# Patient Record
Sex: FEMALE | Race: OTHER | Employment: UNEMPLOYED | URBAN - METROPOLITAN AREA
[De-identification: names, ages, dates, MRNs, and addresses within clinical notes are randomized per-mention and may not be internally consistent; named-entity substitution may affect disease eponyms.]

---
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
## 2022-06-22 ENCOUNTER — HOSPITAL ENCOUNTER (EMERGENCY)
Age: 22
Discharge: HOME | End: 2022-06-22
Attending: EMERGENCY MEDICINE
Payer: MEDICARE

## 2022-06-22 ENCOUNTER — APPOINTMENT (OUTPATIENT)
Dept: GENERAL RADIOLOGY | Age: 22
End: 2022-06-22
Payer: MEDICARE

## 2022-06-22 VITALS
WEIGHT: 240 LBS | TEMPERATURE: 98.1 F | SYSTOLIC BLOOD PRESSURE: 115 MMHG | OXYGEN SATURATION: 100 % | HEART RATE: 55 BPM | HEIGHT: 64 IN | BODY MASS INDEX: 40.97 KG/M2 | DIASTOLIC BLOOD PRESSURE: 63 MMHG | RESPIRATION RATE: 16 BRPM

## 2022-06-22 DIAGNOSIS — M72.2 PLANTAR FASCIITIS OF RIGHT FOOT: Primary | ICD-10-CM

## 2022-06-22 PROCEDURE — 99283 EMERGENCY DEPT VISIT LOW MDM: CPT

## 2022-06-22 PROCEDURE — 73630 X-RAY EXAM OF FOOT: CPT

## 2022-06-22 ASSESSMENT — PAIN SCALES - GENERAL
PAINLEVEL_OUTOF10: 4
PAINLEVEL_OUTOF10: 7

## 2022-06-22 ASSESSMENT — PAIN DESCRIPTION - LOCATION
LOCATION: ANKLE
LOCATION: ANKLE

## 2022-06-22 ASSESSMENT — PAIN DESCRIPTION - ORIENTATION: ORIENTATION: RIGHT

## 2022-06-22 ASSESSMENT — PAIN - FUNCTIONAL ASSESSMENT: PAIN_FUNCTIONAL_ASSESSMENT: 0-10

## 2022-06-22 NOTE — ED PROVIDER NOTES
[facility] ED                                Emergency Department                                               Faculty Attestation     I performed a history and physical examination of the patient and discussed management with the resident. I reviewed the residents note and agree with the documented findings and plan of care. Any areas of disagreement are noted on the chart. I was personally present for the key portions of any procedures. I have documented in the chart those procedures where I was not present during the key portions. I have reviewed the emergency nurses triage note. I agree with the chief complaint, past medical history, past surgical history, allergies, medications, social and family history as documented unless otherwise noted below. For Physician Assistant/ Nurse Practitioner cases/documentation I have personally evaluated this patient and have completed at least one if not all key elements of the E/M (history, physical exam, and MDM). Additional findings are as noted. This patient was evaluated in the Emergency Department for symptoms described in the history of present illness. He/she was evaluated in the context of the global COVID-19 pandemic, which necessitated consideration that the patient might be at risk for infection with the SARS-CoV-2 virus that causes COVID-19. Institutional protocols and algorithms that pertain to the evaluation of patients at risk for COVID-19 are in a state of rapid change based on information released by regulatory bodies including the CDC and federal and state organizations. These policies and algorithms were followed during the patient's care in the ED. 70-year-old female who is obese presents emergency department with left heel and foot pain that is worse in the morning whenever she steps and put pressure on it. She is on chronic steroids. Has never had plantar fasciitis or heel injury before. Is not a runner.   There is any over-the-counter pain medications without much relief. She evaluation she is in no acute distress. She does have point tenderness over the calcaneus on the plantar aspect of the foot. She is distally neurovascular intact with 2+ DP PT pulses as well as less than 2-second cap refill. No overlying skin changes. Will get plain films to rule out any occult fracture due to obesity and chronic steroids, however likely plantar fasciitis. Discussed calf stretches as well as rolling tennis ball or frozen water bottle under her foot first thing in the morning.      Critical Care: NONE    Naomi Martinez,   Emergency Medicine Physician        Afshin Perales 9177, DO  06/22/22 9115

## 2022-06-22 NOTE — ED PROVIDER NOTES
[facility] [facility]  Emergency Department  Emergency Medicine Resident Sign-out     Care of Mamta Osborne was assumed from Dr. Azzie Epley and is being seen for Ankle Pain  . The patient's initial evaluation and plan have been discussed with the prior provider who initially evaluated the patient. EMERGENCY DEPARTMENT COURSE / MEDICAL DECISION MAKING:       MEDICATIONS GIVEN:  No orders of the defined types were placed in this encounter. LABS / RADIOLOGY:     No results found for this visit on 06/22/22. No results found. RECENT VITALS:     Temp: 98.1 °F (36.7 °C),  Heart Rate: 55, Resp: 16, BP: 115/63, SpO2: 100 %    This patient is a 25 y.o. Female with intermittent pain along the plantar aspect of her right foot specifically on the lateral aspect no trauma symptoms been ongoing for months worse in the morning my physical exam consistent with Planter fasciitis however given patient's history of morbid obesity as well as chronic steroid use for her Wegener's granulomatosis as well as lupus will obtain x-ray imaging. Currently pending radiology interpretation of these films however they reviewed were reviewed by the prior resident  and showed no acute bony abnormalities. OUTSTANDING TASKS / RECOMMENDATIONS:    1. F/u imaging  2. Dispo     FINAL IMPRESSION:     1. Plantar fasciitis of right foot      Patient waited 6 hours in the ED awaiting her xray reads. Upon preliminary review of xrays by ED resident and attending, xrays deemed to have no acute abnormalities. Patient discharged home to follow up with imaging on [facility]. If xrays positive for acute bony abnormalities, patient will be contacted. Patient understands and agrees with the plan.     DISPOSITION:         DISPOSITION:  [x]  Home   []  Nursing Facility   []  Transfer -    []  Admission -     FOLLOW-UP: [facility]  [address] [place] [address]  [phone]  Go to   If symptoms worsen, As needed    Reina Love MD  [address] [facility]  [phone]    Call today  for followup and reevaluation in 1-2 days     DISCHARGE MEDICATIONS: Discharge Medication List as of 6/22/2022  9:29 PM             Jaclyn Mesa DO  Emergency Medicine Resident  [facility]       Jaclyn Mesa [place]  Resident  06/23/22 5894

## 2022-06-22 NOTE — ED NOTES
Pt arrived to ED alert and oriented x4 via triage. Pt c/o ankle and foot pain. Pt reports pain to her right ankle and bottom of her right foot, states that she has a hx of Lupus. Pt reports that she takes Prednisone and another medication for her Lupus. Pt denies injury to her foot or ankle. Pt denies having been around anyone suspected to have COVID-19 or anyone that has been sick, denies recent travel outside the [facility] or [address]. RR even and unlabored. NAD noted. Whiteboard updated. Will continue with plan of care.      Evan Lorenzana RN  06/22/22 1996

## 2022-06-22 NOTE — ED PROVIDER NOTES
[facility]  ED  Emergency Department Encounter  EmergencyMedicine Resident     Pt Stacie Heath  MRN: [id number]  [place] 2000  Date of evaluation: 6/22/22  PCP:  Deann Foley MD    This patient was evaluated in the Emergency Department for symptoms described in the history of present illness. The patient was evaluated in the context of the global COVID-19 pandemic, which necessitated consideration that the patient might be at risk for infection with the SARS-CoV-2 virus that causes COVID-19. Institutional protocols and algorithms that pertain to the evaluation of patients at risk for COVID-19 are in a state of rapid change based on information released by regulatory bodies including the CDC and federal and state organizations. These policies and algorithms were followed during the patient's care in the ED. CHIEF COMPLAINT       Chief Complaint   Patient presents with    Ankle Pain       HISTORY OF PRESENT ILLNESS  (Location/Symptom, Timing/Onset, Context/Setting, Quality, Duration, Modifying Factors, Severity.)      Wayne Mccarthy is a 25 y.o. female who presents with complaint of right foot pain along the lateral aspect. It is worse in the morning and improves after patient taking her medication. Patient has a significant past medical history for lupus as well as Wegener's granulomatosis. She states that she is taking 5 and 2.5 mg prednisone alternating dosing every other day. Denies any acute injuries denies any ankle pain despite her triage complaint. Advised to the dorsum of her foot and along the lateral aspect of the right foot. No injuries previously to this extremity no surgeries. Fevers or chills or constitutional symptoms nothing prior to arrival in terms of interventions. PAST MEDICAL / SURGICAL / SOCIAL / FAMILY HISTORY      has no past medical history on file. As above     has no past surgical history on file.   None to extremity    Social History Socioeconomic History    Marital status: Single     Spouse name: Not on file    Number of children: Not on file    Years of education: Not on file    Highest education level: Not on file   Occupational History    Not on file   Tobacco Use    Smoking status: Never Smoker    Smokeless tobacco: Never Used   Substance and Sexual Activity    Alcohol use: No    Drug use: No    Sexual activity: Not on file   Other Topics Concern    Not on file   Social History Narrative    Not on file     Social Determinants of Health     Financial Resource Strain:     Difficulty of Paying Living Expenses: Not on file   Food Insecurity:     Worried About Running Out of Food in the Last Year: Not on file    Tamara of Food in the Last Year: Not on file   Transportation Needs:     Lack of Transportation (Medical): Not on file    Lack of Transportation (Non-Medical): Not on file   Physical Activity:     Days of Exercise per Week: Not on file    Minutes of Exercise per Session: Not on file   Stress:     Feeling of Stress : Not on file   Social Connections:     Frequency of Communication with Friends and Family: Not on file    Frequency of Social Gatherings with Friends and Family: Not on file    Attends Anabaptism Services: Not on file    Active Member of [address] [place] [address] [employer] or Organizations: Not on file    Attends Club or Organization Meetings: Not on file    Marital Status: Not on file   Intimate Partner Violence:     Fear of Current or Ex-Partner: Not on file    Emotionally Abused: Not on file    Physically Abused: Not on file    Sexually Abused: Not on file   Housing Stability:     Unable to Pay for Housing in the Last Year: Not on file    Number of [place] in the Last Year: Not on file    Unstable Housing in the Last Year: Not on file       History reviewed. No pertinent family history. Allergies:  Patient has no known allergies.     Home Medications:  Prior to Admission medications    Medication Sig Start Date End Date Taking? Authorizing Provider   albuterol (PROVENTIL) (2.5 MG/3ML) 0.083% nebulizer solution Inhale 2.5 mg into the lungs 2/21/19   Historical Provider, MD       REVIEW OF SYSTEMS    (2-9 systems for level 4, 10 or more for level 5)      Review of Systems   Constitutional: Negative for fever. Eyes: Negative for visual disturbance. Respiratory: Negative for shortness of breath. Cardiovascular: Negative for chest pain. Gastrointestinal: Negative for abdominal pain. Genitourinary: Negative for flank pain. Musculoskeletal: Positive for arthralgias and myalgias. Negative for gait problem and joint swelling. Skin: Negative for pallor, rash and wound. Allergic/Immunologic: Negative for environmental allergies and food allergies. Neurological: Negative for numbness. Psychiatric/Behavioral: Negative for agitation and confusion. PHYSICAL EXAM   (up to 7 for level 4, 8 or more for level 5)      INITIAL VITALS:   /63   Pulse 55   Temp 98.1 °F (36.7 °C) (Oral)   Resp 16   Ht 5' 4\" (1.626 m)   Wt 240 lb (108.9 kg)   LMP 06/12/2022   SpO2 100%   BMI 41.20 kg/m²     Physical Exam  Vitals and nursing note reviewed. Constitutional:       General: She is not in acute distress. Appearance: Normal appearance. She is obese. She is not ill-appearing, toxic-appearing or diaphoretic. HENT:      Head: Normocephalic and atraumatic. Right Ear: External ear normal.      Left Ear: External ear normal.      Nose: Nose normal.      Mouth/Throat:      Pharynx: Oropharynx is clear. Eyes:      Conjunctiva/sclera: Conjunctivae normal.   Cardiovascular:      Rate and Rhythm: Normal rate. Pulses: Normal pulses. Pulmonary:      Effort: Pulmonary effort is normal. No respiratory distress. Abdominal:      Palpations: Abdomen is soft. Tenderness: There is no abdominal tenderness. Musculoskeletal:         General: Tenderness present. No deformity or signs of injury.  Normal range of motion. Cervical back: Normal range of motion. Right lower leg: No edema. Left lower leg: No edema. Skin:     General: Skin is warm and dry. Capillary Refill: Capillary refill takes less than 2 seconds. Findings: No bruising, erythema, lesion or rash. Neurological:      Mental Status: She is alert and oriented to person, place, and time. Psychiatric:         Mood and Affect: Mood normal.         DIFFERENTIAL  DIAGNOSIS     PLAN (LABS / IMAGING / EKG):  Orders Placed This Encounter   Procedures    XR FOOT RIGHT (MIN 3 VIEWS)    Ice to affected area       MEDICATIONS ORDERED:  No orders of the defined types were placed in this encounter. DDX: plantar fasciitis, occult fracture    DIAGNOSTIC RESULTS / EMERGENCY DEPARTMENT COURSE / MDM   LAB RESULTS:  No results found for this visit on 06/22/22. IMPRESSION: Patient is alert oriented morbidly obese ambulatory 30-year-old female in no acute distress with intermittent pain to the dorsal aspect plantar surface of her right foot history of physical examination are consistent with plantar fasciitis. Will obtain x-ray imaging to evaluate for any bony abnormalities including occult fracture given patient's chronic steroid use and morbid obesity raising risk for these conditions. RADIOLOGY:  XR FOOT RIGHT (MIN 3 VIEWS)    Result Date: 6/22/2022  EXAMINATION: THREE XRAY VIEWS OF THE RIGHT FOOT 6/22/2022 6:04 pm COMPARISON: None. HISTORY: ORDERING SYSTEM PROVIDED HISTORY: pain along lateral aspect x months eval occult fx pt obese and on steroids TECHNOLOGIST PROVIDED HISTORY: pain along lateral aspect x months eval occult fx pt obese and on steroids Reason for Exam: no known injury FINDINGS: No fracture or malalignment identified. The joint spaces are maintained. No discrete soft tissue abnormality identified. No acute osseous abnormality identified.        EKG      All EKG's are interpreted by the Emergency Department Physician who either signs or Co-signs this chart in the absence of a cardiologist.    EMERGENCY DEPARTMENT COURSE:  Evaluated x-ray imaging unremarkable patient was discharged home in stable condition with outpatient follow-up    No notes of EC Admission Criteria type on file. PROCEDURES:      CONSULTS:  None    CRITICAL CARE:      FINAL IMPRESSION      1.  Plantar fasciitis of right foot          [facility] dr. Gato Porter pending xr interpretation      PATIENT REFERRED TO:  [facility] ED  [address] [place] [address]  [phone]  Go to   If symptoms worsen, As needed    Reina Love MD  [id number] [facility] [phone]    Call today  for followup and reevaluation in 1-2 days      DISCHARGE MEDICATIONS:  Discharge Medication List as of 6/22/2022  9:29 PM          Doni Dow DO  Emergency Medicine Resident    (Please note that portions of thisnote were completed with a voice recognition program.  Efforts were made to edit the dictations but occasionally words are mis-transcribed.)       Doni Dow DO  Resident  06/23/22 [id number]

## 2022-06-22 NOTE — ED PROVIDER NOTES
[facility] ED  Emergency Department  Faculty Sign-Out Addendum     Care of Taya Verdugo was assumed from previous attending and is being seen for Ankle Pain  . The patient's initial evaluation and plan have been discussed with the prior provider who initially evaluated the patient. Handoff taken on the following patient from prior Attending Physician:    Aditi Doherty    I was available and discussed any additional care issues that arose and coordinated the management plans with the resident(s) caring for the patient during my duty period. Any areas of disagreement with residents documentation of care or procedures are noted on the chart. I was personally present for the key portions of any/all procedures during my duty period. I have documented in the chart those procedures where I was not present during the key portions. EMERGENCY DEPARTMENT COURSE / MEDICAL DECISION MAKING:       MEDICATIONS GIVEN:  No orders of the defined types were placed in this encounter. LABS / RADIOLOGY:     Labs Reviewed - No data to display    No results found. RECENT VITALS:     Temp: 98.6 °F (37 °C),  Heart Rate: 66, Resp: 16, BP: (!) 94/58, SpO2: 99 %    This patient is a 25 y.o. Female with foot/ankle pain. Concerning for plantar fasciitis. X-ray to evaluate for potential fracture    OUTSTANDING TASKS / RECOMMENDATIONS:    1.  Follow-up on x-ray results      Shaun Golden MD, [place]  Attending Emergency Physician  [facility] ED        Fara Muñiz MD  06/22/22 [id number]

## 2022-06-23 ASSESSMENT — ENCOUNTER SYMPTOMS
SHORTNESS OF BREATH: 0
ABDOMINAL PAIN: 0

## 2023-12-14 ENCOUNTER — HOSPITAL ENCOUNTER (EMERGENCY)
Age: 23
Discharge: HOME | End: 2023-12-14
Attending: EMERGENCY MEDICINE

## 2023-12-14 ENCOUNTER — APPOINTMENT (OUTPATIENT)
Dept: GENERAL RADIOLOGY | Age: 23
End: 2023-12-14

## 2023-12-14 VITALS
WEIGHT: 239.64 LBS | DIASTOLIC BLOOD PRESSURE: 71 MMHG | SYSTOLIC BLOOD PRESSURE: 118 MMHG | BODY MASS INDEX: 41.13 KG/M2 | OXYGEN SATURATION: 99 % | RESPIRATION RATE: 20 BRPM | TEMPERATURE: 98.1 F | HEART RATE: 78 BPM

## 2023-12-14 DIAGNOSIS — J18.9 PNEUMONIA OF RIGHT LOWER LOBE DUE TO INFECTIOUS ORGANISM: Primary | ICD-10-CM

## 2023-12-14 LAB
CHP ED QC CHECK: YES
FLUAV AG SPEC QL: NEGATIVE
FLUBV AG SPEC QL: NEGATIVE
PREGNANCY TEST URINE, POC: NEGATIVE
SARS-COV-2 RDRP RESP QL NAA+PROBE: NOT DETECTED
SPECIMEN DESCRIPTION: NORMAL

## 2023-12-14 PROCEDURE — 6370000000 HC RX 637 (ALT 250 FOR IP): Performed by: EMERGENCY MEDICINE

## 2023-12-14 PROCEDURE — 71046 X-RAY EXAM CHEST 2 VIEWS: CPT

## 2023-12-14 PROCEDURE — 87635 SARS-COV-2 COVID-19 AMP PRB: CPT

## 2023-12-14 PROCEDURE — 87804 INFLUENZA ASSAY W/OPTIC: CPT

## 2023-12-14 RX ORDER — PREDNISONE 2.5 MG/1
2 TABLET ORAL DAILY
COMMUNITY

## 2023-12-14 RX ORDER — ACETAMINOPHEN 500 MG
1000 TABLET ORAL ONCE
Status: COMPLETED | OUTPATIENT
Start: 2023-12-14 | End: 2023-12-14

## 2023-12-14 RX ORDER — ONDANSETRON 4 MG/1
4 TABLET, ORALLY DISINTEGRATING ORAL ONCE
Status: COMPLETED | OUTPATIENT
Start: 2023-12-14 | End: 2023-12-14

## 2023-12-14 RX ORDER — AMOXICILLIN AND CLAVULANATE POTASSIUM 875; 125 MG/1; MG/1
1 TABLET, FILM COATED ORAL ONCE
Status: COMPLETED | OUTPATIENT
Start: 2023-12-14 | End: 2023-12-14

## 2023-12-14 RX ORDER — HYDROXYCHLOROQUINE SULFATE 200 MG/1
200 TABLET, FILM COATED ORAL DAILY
COMMUNITY

## 2023-12-14 RX ORDER — AMOXICILLIN AND CLAVULANATE POTASSIUM 875; 125 MG/1; MG/1
1 TABLET, FILM COATED ORAL EVERY 12 HOURS SCHEDULED
Status: DISCONTINUED | OUTPATIENT
Start: 2023-12-14 | End: 2023-12-14

## 2023-12-14 RX ORDER — AMOXICILLIN AND CLAVULANATE POTASSIUM 875; 125 MG/1; MG/1
1 TABLET, FILM COATED ORAL 2 TIMES DAILY
Qty: 14 TABLET | Refills: 0 | Status: SHIPPED | OUTPATIENT
Start: 2023-12-14 | End: 2023-12-21

## 2023-12-14 RX ORDER — DOXYCYCLINE HYCLATE 100 MG
100 TABLET ORAL ONCE
Status: COMPLETED | OUTPATIENT
Start: 2023-12-14 | End: 2023-12-14

## 2023-12-14 RX ORDER — DOXYCYCLINE HYCLATE 100 MG
100 TABLET ORAL 2 TIMES DAILY
Qty: 14 TABLET | Refills: 0 | Status: SHIPPED | OUTPATIENT
Start: 2023-12-14 | End: 2023-12-21

## 2023-12-14 RX ADMIN — AMOXICILLIN AND CLAVULANATE POTASSIUM 1 TABLET: 875; 125 TABLET, FILM COATED ORAL at 18:23

## 2023-12-14 RX ADMIN — ACETAMINOPHEN 1000 MG: 500 TABLET ORAL at 17:06

## 2023-12-14 RX ADMIN — DOXYCYCLINE HYCLATE 100 MG: 100 TABLET, COATED ORAL at 18:23

## 2023-12-14 RX ADMIN — ONDANSETRON 4 MG: 4 TABLET, ORALLY DISINTEGRATING ORAL at 17:07

## 2023-12-14 ASSESSMENT — LIFESTYLE VARIABLES
HOW OFTEN DO YOU HAVE A DRINK CONTAINING ALCOHOL: NEVER
HOW MANY STANDARD DRINKS CONTAINING ALCOHOL DO YOU HAVE ON A TYPICAL DAY: PATIENT DOES NOT DRINK

## 2023-12-14 NOTE — DISCHARGE INSTRUCTIONS
You were seen today for cough, body aches, chills and headache. Your COVID was negative. Your influenza test was negative. We did a chest x-ray that shows a pneumonia. We gave you the first dose of antibiotics here. Please take antibiotics as prescribed. If you develop worsening shortness of breath, chest pain, productive cough despite antibiotics please return to the emergency department. Otherwise I recommend that you follow-up with your primary care physician within the next couple of days for reevaluation. If you notice any concerning symptoms please return to the ER immediately. These can include but are not limited to: fevers, chills, shortness of breath, vomiting, weakness of the extremities, changes in your mental status, numbness, pale extremities, or chest pain. Wound care: none    Diet: You may resume your normal diet     Activity: resume activity as tolerated. Medications: Continue taking your home medications as previously directed. For pain You may take tylenol 1,000mg by mouth every 6 hours as needed for pain. Do not exceed 4,000mg per day. If you have liver disease don't take tylenol. You may also take ibuprofen 600mg every 6-8 hours as needed for pain. Do not exceed 2,400 mg per day. If you experience stomach pain or you have a history of kidney disease stop taking ibuprofen. You may alternate application of ice and heat 20 minutes at a time as desired. Follow up: Please follow up with your primary care doctor within one week or as needed.

## 2023-12-14 NOTE — ED PROVIDER NOTES
Pupils: Pupils are equal, round, and reactive to light. Cardiovascular:      Rate and Rhythm: Normal rate and regular rhythm. Heart sounds: Normal heart sounds. Pulmonary:      Effort: Pulmonary effort is normal.      Breath sounds: Normal breath sounds. Abdominal:      General: There is no distension. Palpations: Abdomen is soft. Tenderness: There is no abdominal tenderness. There is no guarding. Musculoskeletal:         General: Normal range of motion. Skin:     General: Skin is warm. Neurological:      General: No focal deficit present. Mental Status: She is alert. DDX/DIAGNOSTIC RESULTS / EMERGENCY DEPARTMENT COURSE / MDM     Medical Decision Making  Cough, congestion, body aches  - Hx of lupus  - CXR with R base PNA  - Influenza negative   - Covid  -Will discharge on Augmentin and doxycycline twice daily for 7 days. Discussed follow up and return precautions with patient and they vocalized understanding. Amount and/or Complexity of Data Reviewed  Labs: ordered. Decision-making details documented in ED Course. Radiology: ordered. Risk  OTC drugs. Prescription drug management. EKG        All EKG's are interpreted by the Emergency Department Physician who either signs or Co-signs this chart in the absence of a cardiologist.    EMERGENCY DEPARTMENT COURSE:        ED Course as of 12/14/23 1817   Thu Dec 14, 2023   1739 Pregnancy, Urine: negative [SK]   1753 XR CHEST (2 VW)  Pneumonia the right lung base. [SK]   1756 Flu B Antigen: NEGATIVE [SK]   1756 Flu A Antigen: NEGATIVE [SK]   2448 SARS-CoV-2, Rapid: Not Detected [SK]      ED Course User Index  [SK] Chastity Squire, DO       PROCEDURES:        CONSULTS:  None    CRITICAL CARE:  There was significant risk of life threatening deterioration of patient's condition requiring my direct management. Critical care time 0 minutes, excluding any documented procedures. FINAL IMPRESSION      1.  Pneumonia of right lower lobe due to infectious organism          DISPOSITION / PLAN     DISPOSITION Decision To Discharge 12/14/2023 06:14:49 PM      PATIENT REFERRED TO:  Bessy Nobles MD  [id number] [facility]            DISCHARGE MEDICATIONS:  New Prescriptions    AMOXICILLIN-CLAVULANATE (AUGMENTIN) 875-125 MG PER TABLET    Take 1 tablet by mouth 2 times daily for 7 days    DOXYCYCLINE HYCLATE (VIBRA-TABS) 100 MG TABLET    Take 1 tablet by mouth 2 times daily for 7 days       Elva Hernandez DO  Emergency Medicine Resident    (Please note that portions of this note were completed with a voice recognition program.  Efforts were made to edit the dictations but occasionally words are mis-transcribed.)        Elva Hernandez DO  Resident  12/14/23 [id number]

## 2023-12-14 NOTE — ED NOTES
The following labs were labeled with appropriate pt sticker and tubed to lab:     [] Blue     [] Lavender   [] on ice  [] Green/yellow  [] Green/black [] on ice  [] Madonna Kitten  [] on ice  [] Yellow  [] Red  [] Pink  [] Type/ Screen  [] ABG  [] VBG    [x] COVID-19 swab    [x] Rapid  [] PCR  [x] Flu swab  [] Peds Viral Panel     [] Urine Sample  [] Fecal Sample  [] Pelvic Cultures  [] Blood Cultures  [] X 2  [] STREP Cultures  [] Wound Cultures         Chung Coon RN  12/14/23 0820

## 2023-12-14 NOTE — ED TRIAGE NOTES
Pt presents to the ER with intermittent headache, sweats, and nausea after eating x 1 week.  Pt states she coughs and brings up mucus which is green in color/ yellow

## 2024-07-01 ENCOUNTER — HOSPITAL ENCOUNTER (EMERGENCY)
Age: 24
Discharge: HOME | End: 2024-07-01
Attending: EMERGENCY MEDICINE
Payer: MEDICAID

## 2024-07-01 VITALS
OXYGEN SATURATION: 98 % | BODY MASS INDEX: 41.44 KG/M2 | RESPIRATION RATE: 18 BRPM | TEMPERATURE: 98.7 F | HEART RATE: 97 BPM | WEIGHT: 241.4 LBS | SYSTOLIC BLOOD PRESSURE: 124 MMHG | DIASTOLIC BLOOD PRESSURE: 76 MMHG

## 2024-07-01 DIAGNOSIS — K05.10 GINGIVITIS: Primary | ICD-10-CM

## 2024-07-01 LAB
ERYTHROCYTE [DISTWIDTH] IN BLOOD BY AUTOMATED COUNT: 13.9 % (ref 11.8–14.4)
HCT VFR BLD AUTO: 30.8 % (ref 36.3–47.1)
HGB BLD-MCNC: 10.5 G/DL (ref 11.9–15.1)
MCH RBC QN AUTO: 32.3 PG (ref 25.2–33.5)
MCHC RBC AUTO-ENTMCNC: 34.1 G/DL (ref 28.4–34.8)
MCV RBC AUTO: 94.8 FL (ref 82.6–102.9)
NRBC BLD-RTO: 0 PER 100 WBC
PLATELET # BLD AUTO: 135 K/UL (ref 138–453)
PMV BLD AUTO: 9.8 FL (ref 8.1–13.5)
RBC # BLD AUTO: 3.25 M/UL (ref 3.95–5.11)
WBC OTHER # BLD: 2 K/UL (ref 4.5–13.5)

## 2024-07-01 PROCEDURE — 85027 COMPLETE CBC AUTOMATED: CPT

## 2024-07-01 PROCEDURE — 99283 EMERGENCY DEPT VISIT LOW MDM: CPT | Performed by: EMERGENCY MEDICINE

## 2024-07-01 PROCEDURE — 6370000000 HC RX 637 (ALT 250 FOR IP)

## 2024-07-01 RX ORDER — PENICILLIN V POTASSIUM 500 MG/1
500 TABLET ORAL 4 TIMES DAILY
Qty: 28 TABLET | Refills: 0 | Status: SHIPPED | OUTPATIENT
Start: 2024-07-01 | End: 2024-07-08

## 2024-07-01 RX ORDER — CHLORHEXIDINE GLUCONATE ORAL RINSE 1.2 MG/ML
15 SOLUTION DENTAL 2 TIMES DAILY
Qty: 420 ML | Refills: 0 | Status: SHIPPED | OUTPATIENT
Start: 2024-07-01 | End: 2024-07-15

## 2024-07-01 RX ORDER — PENICILLIN V POTASSIUM 250 MG/1
500 TABLET ORAL ONCE
Status: COMPLETED | OUTPATIENT
Start: 2024-07-01 | End: 2024-07-01

## 2024-07-01 RX ADMIN — PENICILLIN V POTASSIUM 500 MG: 250 TABLET ORAL at 18:53

## 2024-07-01 ASSESSMENT — ENCOUNTER SYMPTOMS
COUGH: 0
TROUBLE SWALLOWING: 1
ABDOMINAL PAIN: 0
VOMITING: 0
BACK PAIN: 0
DIARRHEA: 0
NAUSEA: 0
SHORTNESS OF BREATH: 0
VOICE CHANGE: 0

## 2024-07-01 NOTE — ED NOTES
Pt is A+Ox4  Pt complains of painful gums with bleeding gums x 1 week  Pt states painful gums when brushing teeth  Pt denies any SOB  Pt denies any chest pain  Pt ambulates with a steady gait from triage to the [facility]  Family at the bedside  All questions answered and needs met at this time

## 2024-07-01 NOTE — DISCHARGE INSTRUCTIONS
You have been evaluated in the Emergency Department at [facility] 7/1/2024     Your recommendations and if necessary prescriptions from today's visit:   -Take medication as prescribed  -Follow-up with your PCP  -Continue to brush your teeth    You need to call Ángela Bernstein MD  to make an appointment as directed for follow up.    Should you have any questions regarding your care or further treatment, please call [facility] Emergency Department at [phone].    PLEASE RETURN TO THE EMERGENCY DEPARTMENT IMMEDIATELY for   -Inability to eat or drink for greater than 24 hours  -Fevers, chills, night sweats  -Changing symptoms  -Worsening symptoms  -Unable to follow up with your primary care provider  -Any other care or concern

## 2024-07-01 NOTE — ED PROVIDER NOTES
[facility]  Emergency Department  Faculty Attestation     I performed a history and physical examination of the patient and discussed management with the resident. I reviewed the resident’s note and agree with the documented findings and plan of care. Any areas of disagreement are noted on the chart. I was personally present for the key portions of any procedures. I have documented in the chart those procedures where I was not present during the key portions. I have reviewed the emergency nurses triage note. I agree with the chief complaint, past medical history, past surgical history, allergies, medications, social and family history as documented unless otherwise noted below.    For Physician Assistant/ Nurse Practitioner cases/documentation I have personally evaluated this patient and have completed at least one if not all key elements of the E/M (history, physical exam, and MDM). Additional findings are as noted.    Preliminary note started at 5:26 PM EDT    Primary Care Physician:  Ángela Bernstein MD    Screenings:  [unfilled]    CHIEF COMPLAINT       Chief Complaint   Patient presents with    Oral Pain    bleeding gums       RECENT VITALS:   Pulse 97   Temp 98.7 °F (37.1 °C) (Oral)   Resp 18   Wt 109.5 kg (241 lb 6.5 oz)   SpO2 98%   BMI 41.44 kg/m²     LABS:  Labs Reviewed - No data to display    Radiology  No orders to display       Attending Physician Additional  Notes    Patient has 1 week of bleeding gums and gum pain especially in the mandibular teeth.  No trauma.  She has had epistaxis before but not frequently.  No frequent bruising per patient however mother states that she does.  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  History of venous thromboembolism but not currently on anticoagulation.  She does take Plaquenil and prednisone as well as monthly treatments and her

## 2024-07-01 NOTE — ED PROVIDER NOTES
[facility] ED  Emergency Department Encounter  Emergency Medicine Resident     Pt Name:Patricia Bansal  MRN: [id number]  Birthdate 2000  Date of evaluation: 7/1/24  PCP:  Ángela Bernstein MD  Note Started: 5:37 PM EDT      CHIEF COMPLAINT       Chief Complaint   Patient presents with    Oral Pain    bleeding gums       HISTORY OF PRESENT ILLNESS  (Location/Symptom, Timing/Onset, Context/Setting, Quality, Duration, Modifying Factors, Severity.)      Patricia Bansal is a 23 y.o. female presenting to ED via walk-in for    CC's: Gum pain, gum bleeding    Patient Dors is approximately 1 week of gingival irritation to the entire mouth as well as sensitivity to food and water.  Patient also with bleeding to gums.  Patient denies any fevers, chills, night sweats.  Patient regularly follows with rheumatology at [facility] and has been taking Hydroxychloroquine since 2018    Notable Hx: Lupus, Wegener's granulomatosis/vasculitis, chronic usage of Hydroxychloroquine    PAST MEDICAL / SURGICAL / SOCIAL / FAMILY HISTORY      has no past medical history on file.       has no past surgical history on file.      Social History     Socioeconomic History    Marital status: Single     Spouse name: Not on file    Number of children: Not on file    Years of education: Not on file    Highest education level: Not on file   Occupational History    Not on file   Tobacco Use    Smoking status: Never    Smokeless tobacco: Never   Substance and Sexual Activity    Alcohol use: No    Drug use: No    Sexual activity: Not on file   Other Topics Concern    Not on file   Social History Narrative    Not on file     Social Determinants of Health     Financial Resource Strain: Not on file   Food Insecurity: Not on file   Transportation Needs: Not on file   Physical Activity: Not on file   Stress: Not on file   Social Connections: Not on file   Intimate Partner Violence: Not on file   Housing Stability: Not on file       No family history on

## 2024-08-09 ENCOUNTER — APPOINTMENT (OUTPATIENT)
Dept: CT IMAGING | Age: 24
End: 2024-08-09
Payer: MEDICAID

## 2024-08-09 ENCOUNTER — HOSPITAL ENCOUNTER (EMERGENCY)
Age: 24
Discharge: HOME | End: 2024-08-09
Attending: EMERGENCY MEDICINE
Payer: MEDICAID

## 2024-08-09 ENCOUNTER — APPOINTMENT (OUTPATIENT)
Dept: GENERAL RADIOLOGY | Age: 24
End: 2024-08-09
Payer: MEDICAID

## 2024-08-09 VITALS
RESPIRATION RATE: 18 BRPM | DIASTOLIC BLOOD PRESSURE: 76 MMHG | SYSTOLIC BLOOD PRESSURE: 115 MMHG | OXYGEN SATURATION: 96 % | TEMPERATURE: 97.9 F | HEART RATE: 102 BPM
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
## 2024-08-09 PROCEDURE — 99284 EMERGENCY DEPT VISIT MOD MDM: CPT

## 2024-08-09 PROCEDURE — 72125 CT NECK SPINE W/O DYE: CPT

## 2024-08-09 PROCEDURE — 73130 X-RAY EXAM OF HAND: CPT

## 2024-08-09 PROCEDURE — 70450 CT HEAD/BRAIN W/O DYE: CPT

## 2024-08-09 PROCEDURE — 70486 CT MAXILLOFACIAL W/O DYE: CPT

## 2024-08-09 PROCEDURE — 6370000000 HC RX 637 (ALT 250 FOR IP)

## 2024-08-09 PROCEDURE — 73000 X-RAY EXAM OF COLLAR BONE: CPT

## 2024-08-09 PROCEDURE — 73030 X-RAY EXAM OF SHOULDER: CPT

## 2024-08-09 RX ORDER — IBUPROFEN 800 MG/1
800 TABLET ORAL ONCE
Status: COMPLETED | OUTPATIENT
Start: 2024-08-09 | End: 2024-08-09

## 2024-08-09 RX ORDER — ACETAMINOPHEN 325 MG/1
650 TABLET ORAL EVERY 6 HOURS PRN
Qty: 120 TABLET | Refills: 0 | Status: SHIPPED | OUTPATIENT
Start: 2024-08-09

## 2024-08-09 RX ORDER — IBUPROFEN 600 MG/1
600 TABLET ORAL EVERY 6 HOURS PRN
Qty: 30 TABLET | Refills: 0 | Status: SHIPPED | OUTPATIENT
Start: 2024-08-09

## 2024-08-09 RX ORDER — CYCLOBENZAPRINE HCL 10 MG
5 TABLET ORAL ONCE
Status: COMPLETED | OUTPATIENT
Start: 2024-08-09 | End: 2024-08-09

## 2024-08-09 RX ORDER — ACETAMINOPHEN 500 MG
1000 TABLET ORAL ONCE
Status: COMPLETED | OUTPATIENT
Start: 2024-08-09 | End: 2024-08-09

## 2024-08-09 RX ORDER — LIDOCAINE 4 G/G
1 PATCH TOPICAL ONCE
Status: DISCONTINUED | OUTPATIENT
Start: 2024-08-09 | End: 2024-08-10 | Stop reason: HOSPADM

## 2024-08-09 RX ORDER — CYCLOBENZAPRINE HCL 5 MG
5 TABLET ORAL NIGHTLY PRN
Qty: 3 TABLET | Refills: 0 | Status: SHIPPED | OUTPATIENT
Start: 2024-08-09 | End: 2024-08-12

## 2024-08-09 RX ADMIN — ACETAMINOPHEN 1000 MG: 500 TABLET ORAL at 19:43

## 2024-08-09 RX ADMIN — IBUPROFEN 800 MG: 800 TABLET, FILM COATED ORAL at 19:43

## 2024-08-09 RX ADMIN — CYCLOBENZAPRINE 5 MG: 10 TABLET, FILM COATED ORAL at 20:51

## 2024-08-09 ASSESSMENT — PAIN DESCRIPTION - DESCRIPTORS: DESCRIPTORS: ACHING

## 2024-08-09 ASSESSMENT — ENCOUNTER SYMPTOMS
FACIAL SWELLING: 1
ABDOMINAL PAIN: 0
BACK PAIN: 0
NAUSEA: 0
VOMITING: 0
SHORTNESS OF BREATH: 0

## 2024-08-09 ASSESSMENT — PAIN DESCRIPTION - ORIENTATION: ORIENTATION: RIGHT

## 2024-08-09 ASSESSMENT — PAIN SCALES - GENERAL: PAINLEVEL_OUTOF10: 10
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
## 2024-08-12 ENCOUNTER — CASE MANAGEMENT (OUTPATIENT)
Dept: PSYCHIATRY | Age: 24
End: 2024-08-12
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
## 2024-08-22 ENCOUNTER — CLINICAL DOCUMENTATION (OUTPATIENT)
Dept: PSYCHIATRY | Age: 24
End: 2024-08-22

## 2024-08-22 NOTE — PROGRESS NOTES
has made a 2nd attempt to contact patient to complete an intake for services with the [facility]. The  left another voice message requesting the patient return the call to [facility]. Therefore this referral will be archived until further notice.

## 2024-08-30 ENCOUNTER — HOSPITAL ENCOUNTER (EMERGENCY)
Age: 24
Discharge: HOME | End: 2024-08-30
Attending: EMERGENCY MEDICINE
Payer: MEDICAID

## 2024-08-30 VITALS
WEIGHT: 242.51 LBS | BODY MASS INDEX: 41.4 KG/M2 | OXYGEN SATURATION: 97 % | SYSTOLIC BLOOD PRESSURE: 138 MMHG | DIASTOLIC BLOOD PRESSURE: 74 MMHG | RESPIRATION RATE: 16 BRPM | HEART RATE: 77 BPM | HEIGHT: 64 IN | TEMPERATURE: 98.8 F

## 2024-08-30 DIAGNOSIS — N93.9 VAGINAL BLEEDING: Primary | ICD-10-CM

## 2024-08-30 LAB
ANION GAP SERPL CALCULATED.3IONS-SCNC: 10 MMOL/L (ref 9–16)
BACTERIA URNS QL MICRO: ABNORMAL
BASOPHILS # BLD: <0.03 K/UL (ref 0–0.2)
BASOPHILS NFR BLD: 0 % (ref 0–2)
BILIRUB UR QL STRIP: NEGATIVE
BUN SERPL-MCNC: 10 MG/DL (ref 6–20)
C TRACH DNA SPEC QL PROBE+SIG AMP: NORMAL
CALCIUM SERPL-MCNC: 9.4 MG/DL (ref 8.6–10.4)
CANDIDA SPECIES: NEGATIVE
CASTS #/AREA URNS LPF: ABNORMAL /LPF (ref 0–8)
CHLORIDE SERPL-SCNC: 104 MMOL/L (ref 98–107)
CLARITY UR: ABNORMAL
CO2 SERPL-SCNC: 25 MMOL/L (ref 20–31)
COLOR UR: ABNORMAL
CREAT SERPL-MCNC: 0.6 MG/DL (ref 0.5–0.9)
EOSINOPHIL # BLD: <0.03 K/UL (ref 0–0.44)
EOSINOPHILS RELATIVE PERCENT: 0 % (ref 1–4)
EPI CELLS #/AREA URNS HPF: ABNORMAL /HPF (ref 0–5)
ERYTHROCYTE [DISTWIDTH] IN BLOOD BY AUTOMATED COUNT: 14.4 % (ref 11.8–14.4)
GARDNERELLA VAGINALIS: POSITIVE
GFR, ESTIMATED: >90 ML/MIN/1.73M2
GLUCOSE SERPL-MCNC: 92 MG/DL (ref 74–99)
GLUCOSE UR STRIP-MCNC: NEGATIVE MG/DL
HCG SERPL QL: NEGATIVE
HCT VFR BLD AUTO: 31.6 % (ref 36.3–47.1)
HGB BLD-MCNC: 10.6 G/DL (ref 11.9–15.1)
HGB UR QL STRIP.AUTO: ABNORMAL
HIV 1+2 AB+HIV1 P24 AG SERPL QL IA: NONREACTIVE
IMM GRANULOCYTES # BLD AUTO: <0.03 K/UL (ref 0–0.3)
IMM GRANULOCYTES NFR BLD: 1 %
KETONES UR STRIP-MCNC: NEGATIVE MG/DL
LEUKOCYTE ESTERASE UR QL STRIP: ABNORMAL
LYMPHOCYTES NFR BLD: 1.38 K/UL (ref 1.1–3.7)
LYMPHOCYTES RELATIVE PERCENT: 33 % (ref 25–45)
MCH RBC QN AUTO: 32.7 PG (ref 25.2–33.5)
MCHC RBC AUTO-ENTMCNC: 33.5 G/DL (ref 28.4–34.8)
MCV RBC AUTO: 97.5 FL (ref 82.6–102.9)
MONOCYTES NFR BLD: 1 % (ref 2–8)
MONOCYTES NFR BLD: <0.03 K/UL (ref 0.1–1.4)
N GONORRHOEA DNA SPEC QL PROBE+SIG AMP: NORMAL
NEUTROPHILS NFR BLD: 65 % (ref 34–64)
NEUTS SEG NFR BLD: 2.73 K/UL (ref 1.5–8.1)
NITRITE UR QL STRIP: NEGATIVE
NRBC BLD-RTO: 0 PER 100 WBC
PH UR STRIP: 5.5 [PH] (ref 5–8)
PLATELET # BLD AUTO: 144 K/UL (ref 138–453)
PMV BLD AUTO: 11.1 FL (ref 8.1–13.5)
POTASSIUM SERPL-SCNC: 4.2 MMOL/L (ref 3.7–5.3)
PROT UR STRIP-MCNC: ABNORMAL MG/DL
RBC # BLD AUTO: 3.24 M/UL (ref 3.95–5.11)
RBC #/AREA URNS HPF: ABNORMAL /HPF (ref 0–4)
SODIUM SERPL-SCNC: 139 MMOL/L (ref 136–145)
SOURCE: ABNORMAL
SP GR UR STRIP: 1.03 (ref 1–1.03)
SPECIMEN DESCRIPTION: NORMAL
T PALLIDUM AB SER QL IA: NONREACTIVE
TRICHOMONAS: NEGATIVE
UROBILINOGEN UR STRIP-ACNC: NORMAL EU/DL (ref 0–1)
WBC #/AREA URNS HPF: ABNORMAL /HPF (ref 0–5)
WBC OTHER # BLD: 4.2 K/UL (ref 4.5–13.5)

## 2024-08-30 PROCEDURE — 96374 THER/PROPH/DIAG INJ IV PUSH: CPT | Performed by: EMERGENCY MEDICINE

## 2024-08-30 PROCEDURE — 87077 CULTURE AEROBIC IDENTIFY: CPT

## 2024-08-30 PROCEDURE — 87491 CHLMYD TRACH DNA AMP PROBE: CPT

## 2024-08-30 PROCEDURE — 85025 COMPLETE CBC W/AUTO DIFF WBC: CPT

## 2024-08-30 PROCEDURE — 99284 EMERGENCY DEPT VISIT MOD MDM: CPT | Performed by: EMERGENCY MEDICINE

## 2024-08-30 PROCEDURE — 87660 TRICHOMONAS VAGIN DIR PROBE: CPT

## 2024-08-30 PROCEDURE — 87510 GARDNER VAG DNA DIR PROBE: CPT

## 2024-08-30 PROCEDURE — 81001 URINALYSIS AUTO W/SCOPE: CPT

## 2024-08-30 PROCEDURE — 87591 N.GONORRHOEAE DNA AMP PROB: CPT

## 2024-08-30 PROCEDURE — 87389 HIV-1 AG W/HIV-1&-2 AB AG IA: CPT

## 2024-08-30 PROCEDURE — 6360000002 HC RX W HCPCS

## 2024-08-30 PROCEDURE — 80048 BASIC METABOLIC PNL TOTAL CA: CPT

## 2024-08-30 PROCEDURE — 86780 TREPONEMA PALLIDUM: CPT

## 2024-08-30 PROCEDURE — 84703 CHORIONIC GONADOTROPIN ASSAY: CPT

## 2024-08-30 PROCEDURE — 87480 CANDIDA DNA DIR PROBE: CPT

## 2024-08-30 PROCEDURE — 87086 URINE CULTURE/COLONY COUNT: CPT

## 2024-08-30 RX ORDER — METRONIDAZOLE 500 MG/1
500 TABLET ORAL 2 TIMES DAILY
Qty: 14 TABLET | Refills: 0 | Status: SHIPPED | OUTPATIENT
Start: 2024-08-30 | End: 2024-09-06

## 2024-08-30 RX ORDER — KETOROLAC TROMETHAMINE 30 MG/ML
30 INJECTION, SOLUTION INTRAMUSCULAR; INTRAVENOUS ONCE
Status: COMPLETED | OUTPATIENT
Start: 2024-08-30 | End: 2024-08-30

## 2024-08-30 RX ADMIN — KETOROLAC TROMETHAMINE 30 MG: 30 INJECTION, SOLUTION INTRAMUSCULAR; INTRAVENOUS at 14:43

## 2024-08-30 ASSESSMENT — ENCOUNTER SYMPTOMS
CONSTIPATION: 0
ABDOMINAL PAIN: 1
DIARRHEA: 0
NAUSEA: 0
VOMITING: 0

## 2024-08-30 ASSESSMENT — PAIN SCALES - GENERAL
PAINLEVEL_OUTOF10: 5
PAINLEVEL_OUTOF10: 3

## 2024-08-30 ASSESSMENT — PAIN DESCRIPTION - LOCATION: LOCATION: ABDOMEN

## 2024-08-30 ASSESSMENT — PAIN DESCRIPTION - DESCRIPTORS: DESCRIPTORS: CRAMPING

## 2024-08-30 ASSESSMENT — PAIN - FUNCTIONAL ASSESSMENT: PAIN_FUNCTIONAL_ASSESSMENT: 0-10

## 2024-08-30 NOTE — ED TRIAGE NOTES
Pt presents to ed pt states she has been having period with \" golf size \" blood clots.   Pt state she had period 8/11/24. Pt denies vomiting/nausea. Pt has abdominal cramps.   Pt denied taking any OTC meds.  Pt alert and oriented x4.

## 2024-08-30 NOTE — DISCHARGE INSTRUCTIONS
Call today or tomorrow to follow up with Ángela Bernstein MD  or your OB / GYN in 3-5 days    Potential miscarriage has not been ruled out in your case of pregnancy.   Use ibuprofen or Tylenol (unless prescribed medications that have Tylenol in it) for pain.  You can take over the counter Ibuprofen (advil) tablets (4 tablets every 8 hours or 3 tablets every 6 hours or 2 tablets every 4 hours)    Return to the Emergency Department for worsening of vaginal bleeding, using more than 4 pads per hour, feeling of weakness, dizzy, nausea / vomiting, any other care or concern.

## 2024-08-30 NOTE — ED PROVIDER NOTES
[facility] ED     Emergency Department     Faculty Attestation        I performed a history and physical examination of the patient and discussed management with the resident. I reviewed the resident’s note and agree with the documented findings and plan of care. Any areas of disagreement are noted on the chart. I was personally present for the key portions of any procedures. I have documented in the chart those procedures where I was not present during the key portions. I have reviewed the emergency nurses triage note. I agree with the chief complaint, past medical history, past surgical history, allergies, medications, social and family history as documented unless otherwise noted below.  For Physician Assistant/ Nurse Practitioner cases/documentation I have personally evaluated this patient and have completed at least one if not all key elements of the E/M (history, physical exam, and MDM). Additional findings are as noted.      Vital Signs: BP: 138/74  Pulse: 77  Respirations: 16  Temp: 98.8 °F (37.1 °C) SpO2: 99 %  PCP:  Ángela Bernstein MD  Note Started: 8/30/24, 2:40 PM EDT    Pertinent Comments:     Patient is a 23-year-old female who had her menstrual cycle a week or 2 ago but now has had bleeding over the last 5 days significant on the third day now improved somewhat but was not at 1 point going through 1 pad per hour.   Lower abdominal cramping and is tender in the suprapubic area but does not localize to left or right.   Denies any chest pain or shortness of breath and no palpitations.    Unsure if she could be pregnant or not and does want \"checked for STDs\" as well.    Assessment/Plan: Patient with vaginal bleeding unknown etiology will obviously obtain serum pregnancy test as well as CBC and awaiting pelvic by resident with swabs  At time symptomatic control and reevaluate after      Critical Care  None      (Please note that portions of this

## 2024-08-30 NOTE — ED PROVIDER NOTES
[facility] ED  Emergency Department Encounter  Emergency Medicine Resident     Pt Name:Patricia Bansal  MRN: [id number]  Birthdate 2000  Date of evaluation: 8/30/24  PCP:  Ángela Bernstein MD  Note Started: 2:16 PM EDT      CHIEF COMPLAINT       Chief Complaint   Patient presents with    Vaginal Bleeding       HISTORY OF PRESENT ILLNESS  (Location/Symptom, Timing/Onset, Context/Setting, Quality, Duration, Modifying Factors, Severity.)      Patricia Bansal is a 23 y.o. female with past medical history of lupus and HPV who presents with vaginal bleeding that began on Monday that started as spotting and worsening to passing clots gradually.  She had to change pad every hour.  States that it has slowed slightly.  8/11 through 8/15 was last period.  Associated abdominal pain and cramping.  10 out of 10 in severity this morning, now down to a 5 out of 10 in severity.  Had unprotected sexual intercourse last week, with like to be tested for sexually transmitted infections.  No vaginal itching or discharge.  No pain with sexual intercourse.  Does not use birth control.  No fever, headache, cold-like symptoms.  No nausea, vomiting, diarrhea, constipation.  No urinary symptoms.    PAST MEDICAL / SURGICAL / SOCIAL / FAMILY HISTORY      has a past medical history of Lupus (HCC).       has no past surgical history on file.      Social History     Socioeconomic History    Marital status: Single     Spouse name: Not on file    Number of children: Not on file    Years of education: Not on file    Highest education level: Not on file   Occupational History    Not on file   Tobacco Use    Smoking status: Never    Smokeless tobacco: Never   Substance and Sexual Activity    Alcohol use: No    Drug use: Yes     Types: Marijuana (Weed)     Comment: few days weekly    Sexual activity: Yes     Partners: Male   Other Topics Concern    Not on file   Social History Narrative    Not on file     Social Determinants of Health

## 2024-08-31 LAB
MICROORGANISM SPEC CULT: ABNORMAL
SERVICE CMNT-IMP: ABNORMAL
SPECIMEN DESCRIPTION: ABNORMAL

## 2024-09-03 LAB
C TRACH DNA SPEC QL PROBE+SIG AMP: ABNORMAL
N GONORRHOEA DNA SPEC QL PROBE+SIG AMP: NEGATIVE
SPECIMEN DESCRIPTION: ABNORMAL

## 2024-09-04 ENCOUNTER — TELEPHONE (OUTPATIENT)
Dept: PHARMACY | Age: 24
End: 2024-09-04

## 2024-09-04 RX ORDER — DOXYCYCLINE HYCLATE 100 MG
100 TABLET ORAL 2 TIMES DAILY
Qty: 14 TABLET | Refills: 0 | Status: SHIPPED | OUTPATIENT
Start: 2024-09-04 | End: 2024-09-11

## 2024-09-04 NOTE — TELEPHONE ENCOUNTER
CLINICAL PHARMACY NOTE:  Telephone Follow-up for Positive STD Test    At the time of Patricia Bansal's visit to [facility] Emergency Department on 8/30/2024 STD testing was performed. DNA testing was positive for Chlamydia.      Pregnancy status:  negative.    Spoke to patient on 9/4/2024 to review test results and regarding need to start oral antibiotic therapy to treat the infection.     Instructed patient to abstain from sexual intercourse until receiving the antibiotic and then for 7 days during treatment. Patient also advised to inform any partners that they will need to be tested as well.  Patient verbally expressed understanding of above plan.      Per protocol, prescription for doxycycline 100mg orally twice daily x 7 days sent to [facility] pharmacy on behalf of Dr. Roger Holland.       Emi LUCIA. Ph., CACP, Clinical Pharmacist  Anticoagulation Services, [facility]  9/4/2024  12:50 PM      For Pharmacy Admin Tracking Only    Intervention Detail: New Rx: 1, reason: Needs Additional Therapy  Total # of Interventions Recommended: 1  Total # of Interventions Accepted: 1  Time Spent (min): 15

## 2024-09-04 NOTE — TELEPHONE ENCOUNTER
Attempt made to reach patient by telephone to review results of STD testing.  Left voice message for return call to [phone].

## 2024-11-26 ENCOUNTER — HOSPITAL ENCOUNTER (EMERGENCY)
Age: 24
Discharge: HOME | End: 2024-11-26
Attending: EMERGENCY MEDICINE
Payer: MEDICAID

## 2024-11-26 ENCOUNTER — APPOINTMENT (OUTPATIENT)
Dept: GENERAL RADIOLOGY | Age: 24
End: 2024-11-26
Payer: MEDICAID

## 2024-11-26 VITALS
OXYGEN SATURATION: 99 % | HEART RATE: 92 BPM | BODY MASS INDEX: 40.97 KG/M2 | WEIGHT: 240 LBS | DIASTOLIC BLOOD PRESSURE: 71 MMHG | TEMPERATURE: 98.7 F | RESPIRATION RATE: 19 BRPM | HEIGHT: 64 IN | SYSTOLIC BLOOD PRESSURE: 112 MMHG

## 2024-11-26 DIAGNOSIS — L03.012 PARONYCHIA OF FINGER OF LEFT HAND: Primary | ICD-10-CM

## 2024-11-26 PROCEDURE — 6360000002 HC RX W HCPCS

## 2024-11-26 PROCEDURE — 99283 EMERGENCY DEPT VISIT LOW MDM: CPT

## 2024-11-26 PROCEDURE — 73130 X-RAY EXAM OF HAND: CPT

## 2024-11-26 PROCEDURE — 6370000000 HC RX 637 (ALT 250 FOR IP)

## 2024-11-26 PROCEDURE — 11740 EVACUATION SUBUNGUAL HMTMA: CPT

## 2024-11-26 RX ORDER — CEPHALEXIN 500 MG/1
500 CAPSULE ORAL ONCE
Status: COMPLETED | OUTPATIENT
Start: 2024-11-26 | End: 2024-11-26

## 2024-11-26 RX ORDER — LIDOCAINE HYDROCHLORIDE 10 MG/ML
20 INJECTION, SOLUTION INFILTRATION; PERINEURAL ONCE
Status: COMPLETED | OUTPATIENT
Start: 2024-11-26 | End: 2024-11-26

## 2024-11-26 RX ORDER — SULFAMETHOXAZOLE AND TRIMETHOPRIM 800; 160 MG/1; MG/1
1 TABLET ORAL 2 TIMES DAILY
Qty: 14 TABLET | Refills: 0 | Status: ON HOLD | OUTPATIENT
Start: 2024-11-26 | End: 2024-12-05 | Stop reason: HOSPADM

## 2024-11-26 RX ORDER — SULFAMETHOXAZOLE AND TRIMETHOPRIM 800; 160 MG/1; MG/1
1 TABLET ORAL ONCE
Status: COMPLETED | OUTPATIENT
Start: 2024-11-26 | End: 2024-11-26

## 2024-11-26 RX ORDER — CEPHALEXIN 500 MG/1
500 CAPSULE ORAL 4 TIMES DAILY
Qty: 28 CAPSULE | Refills: 0 | Status: ON HOLD | OUTPATIENT
Start: 2024-11-26 | End: 2024-12-03

## 2024-11-26 RX ADMIN — SULFAMETHOXAZOLE AND TRIMETHOPRIM 1 TABLET: 800; 160 TABLET ORAL at 21:03

## 2024-11-26 RX ADMIN — LIDOCAINE HYDROCHLORIDE 20 ML: 10 INJECTION, SOLUTION INFILTRATION; PERINEURAL at 19:23

## 2024-11-26 RX ADMIN — CEPHALEXIN 500 MG: 500 CAPSULE ORAL at 21:03

## 2024-11-26 ASSESSMENT — PAIN DESCRIPTION - LOCATION: LOCATION: FINGER (COMMENT WHICH ONE)

## 2024-11-26 ASSESSMENT — PAIN SCALES - GENERAL: PAINLEVEL_OUTOF10: 10

## 2024-11-26 ASSESSMENT — PAIN DESCRIPTION - ORIENTATION: ORIENTATION: LEFT

## 2024-11-26 ASSESSMENT — PAIN - FUNCTIONAL ASSESSMENT: PAIN_FUNCTIONAL_ASSESSMENT: 0-10

## 2024-11-26 NOTE — ED PROVIDER NOTES
[facility]     Emergency Department     Faculty Attestation    I performed a history and physical examination of the patient and discussed management with the resident. I reviewed the resident's note and agree with the documented findings and plan of care. Any areas of disagreement are noted on the chart. I was personally present for the key portions of any procedures. I have documented in the chart those procedures where I was not present during the key portions. I have reviewed the emergency nurses triage note. I agree with the chief complaint, past medical history, past surgical history, allergies, medications, social and family history as documented unless otherwise noted below. For Physician Assistant/ Nurse Practitioner cases/documentation I have personally evaluated this patient and have completed at least one if not all key elements of the E/M (history, physical exam, and MDM). Additional findings are as noted.    Right-hand-dominant, pain and swelling tip of left middle finger.     Anibal Jeffries MD  11/26/24 [id number]
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
## 2024-11-27 NOTE — DISCHARGE INSTRUCTIONS
-You were seen and evaluated by emergency medicine physicians at [facility].    -Please follow-up with your primary care physician and/or with the referrals to specialist.    -You were diagnosed with: Paronychia    -X-ray imaging showed no fracture or dislocation.  The acrylic nail was removed, which showed an infection beneath the nail plate.  Trephination was performed.  Small amount of purulent discharge came out with manipulation of the finger.  You were given antibiotics here in the ED and discharged with a prescription of them.  Please take as prescribed and to completion.  Please follow-up with your PCP in 3 days    -Please return to the Emergency Department if you are experiencing the following symptoms acutely: Increased finger swelling, increased redness and swelling of the finger, decreased range of motion or inability to flex the finger, headache, fever, chills, nausea, vomiting, chest pain, shortness of breath, abdominal pain, change with urination, change with bowel movements, change in your skin/hair/nail, weakness, fatigue, altered mental status and/or any change from baseline health.    -Thank you for coming to [facility].

## 2024-11-27 NOTE — ED NOTES
Received report from Pam NOONAN. Pt A&O x 4, does not appear in acute distress, RR even and unlabored, resting comfortably on stretcher with eyes open. Pt voices no other concerns at this time. Continuing with current POC.

## 2024-11-30 ENCOUNTER — HOSPITAL ENCOUNTER (INPATIENT)
Age: 24
LOS: 10 days | Discharge: HOME | DRG: 720 | End: 2024-12-10
Attending: EMERGENCY MEDICINE | Admitting: INTERNAL MEDICINE
Payer: MEDICAID

## 2024-11-30 ENCOUNTER — APPOINTMENT (OUTPATIENT)
Dept: ULTRASOUND IMAGING | Age: 24
DRG: 720 | End: 2024-11-30
Payer: MEDICAID

## 2024-11-30 ENCOUNTER — APPOINTMENT (OUTPATIENT)
Dept: GENERAL RADIOLOGY | Age: 24
DRG: 720 | End: 2024-11-30
Payer: MEDICAID

## 2024-11-30 DIAGNOSIS — R00.0 TACHYCARDIA: ICD-10-CM

## 2024-11-30 DIAGNOSIS — D70.9 NEUTROPENIA, UNSPECIFIED TYPE (HCC): Primary | ICD-10-CM

## 2024-11-30 DIAGNOSIS — L03.012 ABSCESS AROUND NAIL OF LEFT INDEX FINGER: ICD-10-CM

## 2024-11-30 DIAGNOSIS — M79.642 LEFT HAND PAIN: ICD-10-CM

## 2024-11-30 DIAGNOSIS — R79.89 POSITIVE D DIMER: ICD-10-CM

## 2024-11-30 DIAGNOSIS — L73.9 FOLLICULITIS OF PERINEUM: ICD-10-CM

## 2024-11-30 DIAGNOSIS — N30.01 ACUTE CYSTITIS WITH HEMATURIA: ICD-10-CM

## 2024-11-30 PROBLEM — L03.90 CELLULITIS: Status: ACTIVE | Noted: 2024-11-30

## 2024-11-30 LAB
ALBUMIN SERPL-MCNC: 3.4 G/DL (ref 3.5–5.2)
ALBUMIN/GLOB SERPL: 1 {RATIO} (ref 1–2.5)
ALP SERPL-CCNC: 94 U/L (ref 35–104)
ALT SERPL-CCNC: 736 U/L (ref 10–35)
ANION GAP SERPL CALCULATED.3IONS-SCNC: 10 MMOL/L (ref 9–16)
AST SERPL-CCNC: 1278 U/L (ref 10–35)
BACTERIA URNS QL MICRO: ABNORMAL
BASOPHILS # BLD: 0 K/UL (ref 0–0.2)
BASOPHILS NFR BLD: 0 % (ref 0–2)
BILIRUB SERPL-MCNC: 0.9 MG/DL (ref 0–1.2)
BILIRUB UR QL STRIP: ABNORMAL
BUN SERPL-MCNC: 11 MG/DL (ref 6–20)
C3 SERPL-MCNC: 190 MG/DL (ref 90–180)
C4 SERPL-MCNC: 34 MG/DL (ref 10–40)
CALCIUM SERPL-MCNC: 8.3 MG/DL (ref 8.6–10.4)
CASTS #/AREA URNS LPF: ABNORMAL /LPF (ref 0–8)
CHLORIDE SERPL-SCNC: 98 MMOL/L (ref 98–107)
CLARITY UR: ABNORMAL
CO2 SERPL-SCNC: 23 MMOL/L (ref 20–31)
COLOR UR: ABNORMAL
CREAT SERPL-MCNC: 0.8 MG/DL (ref 0.6–0.9)
CRP SERPL HS-MCNC: 322 MG/L (ref 0–5)
EOSINOPHIL # BLD: 0 K/UL (ref 0–0.4)
EOSINOPHILS RELATIVE PERCENT: 0 % (ref 1–4)
EPI CELLS #/AREA URNS HPF: ABNORMAL /HPF (ref 0–5)
ERYTHROCYTE [DISTWIDTH] IN BLOOD BY AUTOMATED COUNT: 14.6 % (ref 11.8–14.4)
ERYTHROCYTE [SEDIMENTATION RATE] IN BLOOD BY PHOTOMETRIC METHOD: 67 MM/HR (ref 0–20)
GFR, ESTIMATED: >90 ML/MIN/1.73M2
GLUCOSE SERPL-MCNC: 102 MG/DL (ref 74–99)
GLUCOSE UR STRIP-MCNC: NEGATIVE MG/DL
HAV IGM SERPL QL IA: NONREACTIVE
HBV CORE IGM SERPL QL IA: NONREACTIVE
HBV SURFACE AG SERPL QL IA: NONREACTIVE
HCG SERPL QL: NEGATIVE
HCT VFR BLD AUTO: 31.3 % (ref 36.3–47.1)
HCV AB SERPL QL IA: NONREACTIVE
HGB BLD-MCNC: 10.7 G/DL (ref 11.9–15.1)
HGB UR QL STRIP.AUTO: ABNORMAL
HIV 1+2 AB+HIV1 P24 AG SERPL QL IA: NONREACTIVE
IGE SERPL-ACNC: <1 IU/ML (ref 0–100)
IMM GRANULOCYTES # BLD AUTO: 0.01 K/UL (ref 0–0.3)
IMM GRANULOCYTES NFR BLD: 1 %
INR PPP: 1.2
KETONES UR STRIP-MCNC: ABNORMAL MG/DL
LACTIC ACID, WHOLE BLOOD: 1.7 MMOL/L (ref 0.7–2.1)
LEUKOCYTE ESTERASE UR QL STRIP: ABNORMAL
LYMPHOCYTES NFR BLD: 0.5 K/UL (ref 1–4.8)
LYMPHOCYTES RELATIVE PERCENT: 42 % (ref 24–44)
MCH RBC QN AUTO: 31.3 PG (ref 25.2–33.5)
MCHC RBC AUTO-ENTMCNC: 34.2 G/DL (ref 28.4–34.8)
MCV RBC AUTO: 91.5 FL (ref 82.6–102.9)
MONOCYTES NFR BLD: 0.06 K/UL (ref 0.1–0.8)
MONOCYTES NFR BLD: 5 % (ref 1–7)
MORPHOLOGY: ABNORMAL
NEUTROPHILS NFR BLD: 52 % (ref 36–66)
NEUTS SEG NFR BLD: 0.63 K/UL (ref 1.8–7.7)
NITRITE UR QL STRIP: POSITIVE
NRBC BLD-RTO: 0 PER 100 WBC
PARTIAL THROMBOPLASTIN TIME: 24.8 SEC (ref 23–36.5)
PH UR STRIP: 5.5 [PH] (ref 5–8)
PLATELET # BLD AUTO: ABNORMAL K/UL (ref 138–453)
PLATELET, FLUORESCENCE: ABNORMAL K/UL (ref 138–453)
PLATELETS.RETICULATED NFR BLD AUTO: 3.8 % (ref 1.1–10.3)
POTASSIUM SERPL-SCNC: 3.9 MMOL/L (ref 3.7–5.3)
PROCALCITONIN SERPL-MCNC: 0.7 NG/ML (ref 0–0.09)
PROT SERPL-MCNC: 6.7 G/DL (ref 6.6–8.7)
PROT UR STRIP-MCNC: ABNORMAL MG/DL
PROTHROMBIN TIME: 14.7 SEC (ref 11.7–14.9)
RBC # BLD AUTO: 3.42 M/UL (ref 3.95–5.11)
RBC #/AREA URNS HPF: ABNORMAL /HPF (ref 0–4)
SODIUM SERPL-SCNC: 131 MMOL/L (ref 136–145)
SP GR UR STRIP: 1.03 (ref 1–1.03)
UROBILINOGEN UR STRIP-ACNC: ABNORMAL EU/DL (ref 0–1)
WBC #/AREA URNS HPF: ABNORMAL /HPF (ref 0–5)
WBC OTHER # BLD: 1.2 K/UL (ref 3.5–11.3)

## 2024-11-30 PROCEDURE — 80053 COMPREHEN METABOLIC PANEL: CPT

## 2024-11-30 PROCEDURE — 82785 ASSAY OF IGE: CPT

## 2024-11-30 PROCEDURE — 2580000003 HC RX 258: Performed by: INTERNAL MEDICINE

## 2024-11-30 PROCEDURE — 82550 ASSAY OF CK (CPK): CPT

## 2024-11-30 PROCEDURE — 85055 RETICULATED PLATELET ASSAY: CPT

## 2024-11-30 PROCEDURE — 99285 EMERGENCY DEPT VISIT HI MDM: CPT

## 2024-11-30 PROCEDURE — 93005 ELECTROCARDIOGRAM TRACING: CPT | Performed by: INTERNAL MEDICINE

## 2024-11-30 PROCEDURE — 87070 CULTURE OTHR SPECIMN AEROBIC: CPT

## 2024-11-30 PROCEDURE — 2580000003 HC RX 258: Performed by: EMERGENCY MEDICINE

## 2024-11-30 PROCEDURE — 6370000000 HC RX 637 (ALT 250 FOR IP): Performed by: INTERNAL MEDICINE

## 2024-11-30 PROCEDURE — 36415 COLL VENOUS BLD VENIPUNCTURE: CPT

## 2024-11-30 PROCEDURE — 6360000002 HC RX W HCPCS: Performed by: EMERGENCY MEDICINE

## 2024-11-30 PROCEDURE — 6370000000 HC RX 637 (ALT 250 FOR IP)

## 2024-11-30 PROCEDURE — 87075 CULTR BACTERIA EXCEPT BLOOD: CPT

## 2024-11-30 PROCEDURE — 86160 COMPLEMENT ANTIGEN: CPT

## 2024-11-30 PROCEDURE — 85025 COMPLETE CBC W/AUTO DIFF WBC: CPT

## 2024-11-30 PROCEDURE — 85610 PROTHROMBIN TIME: CPT

## 2024-11-30 PROCEDURE — 86038 ANTINUCLEAR ANTIBODIES: CPT

## 2024-11-30 PROCEDURE — 99223 1ST HOSP IP/OBS HIGH 75: CPT | Performed by: INTERNAL MEDICINE

## 2024-11-30 PROCEDURE — 85730 THROMBOPLASTIN TIME PARTIAL: CPT

## 2024-11-30 PROCEDURE — 6360000002 HC RX W HCPCS

## 2024-11-30 PROCEDURE — 87086 URINE CULTURE/COLONY COUNT: CPT

## 2024-11-30 PROCEDURE — 85652 RBC SED RATE AUTOMATED: CPT

## 2024-11-30 PROCEDURE — 87389 HIV-1 AG W/HIV-1&-2 AB AG IA: CPT

## 2024-11-30 PROCEDURE — 81001 URINALYSIS AUTO W/SCOPE: CPT

## 2024-11-30 PROCEDURE — 6360000002 HC RX W HCPCS: Performed by: INTERNAL MEDICINE

## 2024-11-30 PROCEDURE — 87529 HSV DNA AMP PROBE: CPT

## 2024-11-30 PROCEDURE — 80074 ACUTE HEPATITIS PANEL: CPT

## 2024-11-30 PROCEDURE — 83605 ASSAY OF LACTIC ACID: CPT

## 2024-11-30 PROCEDURE — 84703 CHORIONIC GONADOTROPIN ASSAY: CPT

## 2024-11-30 PROCEDURE — 71045 X-RAY EXAM CHEST 1 VIEW: CPT

## 2024-11-30 PROCEDURE — 96365 THER/PROPH/DIAG IV INF INIT: CPT

## 2024-11-30 PROCEDURE — 84145 PROCALCITONIN (PCT): CPT

## 2024-11-30 PROCEDURE — 83874 ASSAY OF MYOGLOBIN: CPT

## 2024-11-30 PROCEDURE — 96367 TX/PROPH/DG ADDL SEQ IV INF: CPT

## 2024-11-30 PROCEDURE — 87641 MR-STAPH DNA AMP PROBE: CPT

## 2024-11-30 PROCEDURE — 87040 BLOOD CULTURE FOR BACTERIA: CPT

## 2024-11-30 PROCEDURE — 2580000003 HC RX 258

## 2024-11-30 PROCEDURE — 2060000000 HC ICU INTERMEDIATE R&B

## 2024-11-30 PROCEDURE — 86225 DNA ANTIBODY NATIVE: CPT

## 2024-11-30 PROCEDURE — 86140 C-REACTIVE PROTEIN: CPT

## 2024-11-30 PROCEDURE — 87205 SMEAR GRAM STAIN: CPT

## 2024-11-30 RX ORDER — ALBUTEROL SULFATE 0.83 MG/ML
2.5 SOLUTION RESPIRATORY (INHALATION) EVERY 4 HOURS PRN
Status: DISCONTINUED | OUTPATIENT
Start: 2024-11-30 | End: 2024-12-10 | Stop reason: HOSPADM

## 2024-11-30 RX ORDER — ONDANSETRON 4 MG/1
4 TABLET, ORALLY DISINTEGRATING ORAL EVERY 8 HOURS PRN
Status: DISCONTINUED | OUTPATIENT
Start: 2024-11-30 | End: 2024-12-10 | Stop reason: HOSPADM

## 2024-11-30 RX ORDER — HYDROCORTISONE SODIUM SUCCINATE 100 MG/2ML
50 INJECTION INTRAMUSCULAR; INTRAVENOUS EVERY 8 HOURS
Status: DISCONTINUED | OUTPATIENT
Start: 2024-11-30 | End: 2024-12-01

## 2024-11-30 RX ORDER — SODIUM CHLORIDE 0.9 % (FLUSH) 0.9 %
5-40 SYRINGE (ML) INJECTION EVERY 12 HOURS SCHEDULED
Status: DISCONTINUED | OUTPATIENT
Start: 2024-11-30 | End: 2024-12-10 | Stop reason: HOSPADM

## 2024-11-30 RX ORDER — POTASSIUM CHLORIDE 7.45 MG/ML
10 INJECTION INTRAVENOUS PRN
Status: DISCONTINUED | OUTPATIENT
Start: 2024-11-30 | End: 2024-12-10 | Stop reason: HOSPADM

## 2024-11-30 RX ORDER — SODIUM CHLORIDE 9 MG/ML
INJECTION, SOLUTION INTRAVENOUS CONTINUOUS
Status: DISCONTINUED | OUTPATIENT
Start: 2024-11-30 | End: 2024-12-03

## 2024-11-30 RX ORDER — SODIUM CHLORIDE 9 MG/ML
INJECTION, SOLUTION INTRAVENOUS PRN
Status: DISCONTINUED | OUTPATIENT
Start: 2024-11-30 | End: 2024-12-10 | Stop reason: HOSPADM

## 2024-11-30 RX ORDER — METRONIDAZOLE 500 MG/100ML
500 INJECTION, SOLUTION INTRAVENOUS EVERY 8 HOURS
Status: DISCONTINUED | OUTPATIENT
Start: 2024-11-30 | End: 2024-12-01

## 2024-11-30 RX ORDER — CLINDAMYCIN HYDROCHLORIDE 150 MG/1
300 CAPSULE ORAL EVERY 8 HOURS SCHEDULED
Status: DISCONTINUED | OUTPATIENT
Start: 2024-11-30 | End: 2024-11-30

## 2024-11-30 RX ORDER — CLOTRIMAZOLE 1 %
CREAM (GRAM) TOPICAL
Qty: 1 EACH | Refills: 1 | Status: SHIPPED | OUTPATIENT
Start: 2024-11-30 | End: 2024-12-07

## 2024-11-30 RX ORDER — MAGNESIUM SULFATE 1 G/100ML
1000 INJECTION INTRAVENOUS PRN
Status: DISCONTINUED | OUTPATIENT
Start: 2024-11-30 | End: 2024-12-10 | Stop reason: HOSPADM

## 2024-11-30 RX ORDER — POTASSIUM CHLORIDE 1500 MG/1
40 TABLET, EXTENDED RELEASE ORAL PRN
Status: DISCONTINUED | OUTPATIENT
Start: 2024-11-30 | End: 2024-12-10 | Stop reason: HOSPADM

## 2024-11-30 RX ORDER — SODIUM CHLORIDE 0.9 % (FLUSH) 0.9 %
10 SYRINGE (ML) INJECTION PRN
Status: DISCONTINUED | OUTPATIENT
Start: 2024-11-30 | End: 2024-12-10 | Stop reason: HOSPADM

## 2024-11-30 RX ORDER — MICONAZOLE NITRATE 20 MG/G
CREAM TOPICAL 2 TIMES DAILY
Status: DISCONTINUED | OUTPATIENT
Start: 2024-11-30 | End: 2024-12-10 | Stop reason: HOSPADM

## 2024-11-30 RX ORDER — LIDOCAINE HYDROCHLORIDE 10 MG/ML
5 INJECTION, SOLUTION INFILTRATION; PERINEURAL ONCE
Status: DISCONTINUED | OUTPATIENT
Start: 2024-11-30 | End: 2024-11-30

## 2024-11-30 RX ORDER — ACETAMINOPHEN 650 MG/1
650 SUPPOSITORY RECTAL EVERY 6 HOURS PRN
Status: DISCONTINUED | OUTPATIENT
Start: 2024-11-30 | End: 2024-12-02

## 2024-11-30 RX ORDER — CLOTRIMAZOLE 1 %
CREAM WITH APPLICATOR VAGINAL 2 TIMES DAILY
Status: DISCONTINUED | OUTPATIENT
Start: 2024-11-30 | End: 2024-12-10 | Stop reason: HOSPADM

## 2024-11-30 RX ORDER — LIDOCAINE 50 MG/G
OINTMENT TOPICAL
Qty: 50 G | Refills: 1 | Status: SHIPPED | OUTPATIENT
Start: 2024-11-30

## 2024-11-30 RX ORDER — ACETAMINOPHEN 325 MG/1
650 TABLET ORAL ONCE
Status: COMPLETED | OUTPATIENT
Start: 2024-11-30 | End: 2024-11-30

## 2024-11-30 RX ORDER — LIDOCAINE HYDROCHLORIDE AND EPINEPHRINE 10; 10 MG/ML; UG/ML
20 INJECTION, SOLUTION INFILTRATION; PERINEURAL ONCE
Status: DISCONTINUED | OUTPATIENT
Start: 2024-11-30 | End: 2024-11-30

## 2024-11-30 RX ORDER — HYDROXYCHLOROQUINE SULFATE 200 MG/1
200 TABLET, FILM COATED ORAL DAILY
Status: DISCONTINUED | OUTPATIENT
Start: 2024-12-01 | End: 2024-12-10 | Stop reason: HOSPADM

## 2024-11-30 RX ORDER — FLUCONAZOLE 50 MG/1
150 TABLET ORAL ONCE
Status: COMPLETED | OUTPATIENT
Start: 2024-11-30 | End: 2024-11-30

## 2024-11-30 RX ORDER — ENOXAPARIN SODIUM 100 MG/ML
30 INJECTION SUBCUTANEOUS 2 TIMES DAILY
Status: DISCONTINUED | OUTPATIENT
Start: 2024-11-30 | End: 2024-12-10 | Stop reason: HOSPADM

## 2024-11-30 RX ORDER — POLYETHYLENE GLYCOL 3350 17 G/17G
17 POWDER, FOR SOLUTION ORAL DAILY PRN
Status: DISCONTINUED | OUTPATIENT
Start: 2024-11-30 | End: 2024-12-10 | Stop reason: HOSPADM

## 2024-11-30 RX ORDER — ACETAMINOPHEN 325 MG/1
650 TABLET ORAL EVERY 6 HOURS PRN
Status: DISCONTINUED | OUTPATIENT
Start: 2024-11-30 | End: 2024-12-02

## 2024-11-30 RX ORDER — 0.9 % SODIUM CHLORIDE 0.9 %
1000 INTRAVENOUS SOLUTION INTRAVENOUS ONCE
Status: COMPLETED | OUTPATIENT
Start: 2024-11-30 | End: 2024-11-30

## 2024-11-30 RX ORDER — LIDOCAINE HYDROCHLORIDE 40 MG/ML
SOLUTION TOPICAL PRN
Status: DISCONTINUED | OUTPATIENT
Start: 2024-11-30 | End: 2024-12-10 | Stop reason: HOSPADM

## 2024-11-30 RX ORDER — CLINDAMYCIN HYDROCHLORIDE 300 MG/1
300 CAPSULE ORAL 3 TIMES DAILY
Qty: 21 CAPSULE | Refills: 0 | Status: SHIPPED | OUTPATIENT
Start: 2024-11-30 | End: 2024-12-10 | Stop reason: HOSPADM

## 2024-11-30 RX ORDER — ONDANSETRON 2 MG/ML
4 INJECTION INTRAMUSCULAR; INTRAVENOUS EVERY 6 HOURS PRN
Status: DISCONTINUED | OUTPATIENT
Start: 2024-11-30 | End: 2024-12-10 | Stop reason: HOSPADM

## 2024-11-30 RX ORDER — LIDOCAINE HYDROCHLORIDE 40 MG/ML
SOLUTION TOPICAL ONCE
Status: COMPLETED | OUTPATIENT
Start: 2024-11-30 | End: 2024-11-30

## 2024-11-30 RX ADMIN — LIDOCAINE HYDROCHLORIDE: 40 SOLUTION TOPICAL at 19:25

## 2024-11-30 RX ADMIN — ACYCLOVIR SODIUM 750 MG: 50 INJECTION, SOLUTION INTRAVENOUS at 18:51

## 2024-11-30 RX ADMIN — ENOXAPARIN SODIUM 30 MG: 100 INJECTION SUBCUTANEOUS at 23:38

## 2024-11-30 RX ADMIN — SODIUM CHLORIDE 1000 ML: 9 INJECTION, SOLUTION INTRAVENOUS at 17:16

## 2024-11-30 RX ADMIN — MICONAZOLE NITRATE: 20 CREAM TOPICAL at 19:23

## 2024-11-30 RX ADMIN — METRONIDAZOLE 500 MG: 500 INJECTION, SOLUTION INTRAVENOUS at 23:19

## 2024-11-30 RX ADMIN — ACETAMINOPHEN 650 MG: 325 TABLET ORAL at 16:41

## 2024-11-30 RX ADMIN — FLUCONAZOLE 150 MG: 50 TABLET ORAL at 23:22

## 2024-11-30 RX ADMIN — SODIUM CHLORIDE: 9 INJECTION, SOLUTION INTRAVENOUS at 23:13

## 2024-11-30 RX ADMIN — SODIUM CHLORIDE 1500 MG: 9 INJECTION, SOLUTION INTRAVENOUS at 23:12

## 2024-11-30 RX ADMIN — HYDROCORTISONE SODIUM SUCCINATE 50 MG: 100 INJECTION, POWDER, FOR SOLUTION INTRAMUSCULAR; INTRAVENOUS at 23:20

## 2024-11-30 RX ADMIN — FLUCONAZOLE 150 MG: 50 TABLET ORAL at 18:24

## 2024-11-30 RX ADMIN — PIPERACILLIN AND TAZOBACTAM 3375 MG: 3; .375 INJECTION, POWDER, LYOPHILIZED, FOR SOLUTION INTRAVENOUS at 17:29

## 2024-11-30 RX ADMIN — SODIUM CHLORIDE: 9 INJECTION, SOLUTION INTRAVENOUS at 23:06

## 2024-11-30 ASSESSMENT — PAIN SCALES - GENERAL
PAINLEVEL_OUTOF10: 8
PAINLEVEL_OUTOF10: 3
PAINLEVEL_OUTOF10: 10

## 2024-11-30 ASSESSMENT — PAIN - FUNCTIONAL ASSESSMENT: PAIN_FUNCTIONAL_ASSESSMENT: 0-10

## 2024-11-30 ASSESSMENT — PAIN DESCRIPTION - ORIENTATION: ORIENTATION: MID

## 2024-11-30 ASSESSMENT — PAIN DESCRIPTION - LOCATION
LOCATION: HAND
LOCATION: ABDOMEN

## 2024-11-30 ASSESSMENT — PAIN DESCRIPTION - DESCRIPTORS
DESCRIPTORS: THROBBING
DESCRIPTORS: CRAMPING

## 2024-11-30 NOTE — ED NOTES
Pt emergency department, c/o painful cutaneous infection of the groin.  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  In approximately October she developed a significant painful sores and erosions over the groin, and self discontinued the 5-FU.  On Wednesday she reports shaving the area, and developed a painful vesicular rash over the skin and has been extruding a yellow purulent discharge.  She also notably has a infection of the finger, and a history of Wegener's granulomatosis controlled with immunosuppressive therapy.

## 2024-12-01 ENCOUNTER — APPOINTMENT (OUTPATIENT)
Dept: ULTRASOUND IMAGING | Age: 24
DRG: 720 | End: 2024-12-01
Payer: MEDICAID

## 2024-12-01 ENCOUNTER — APPOINTMENT (OUTPATIENT)
Dept: CT IMAGING | Age: 24
DRG: 720 | End: 2024-12-01
Payer: MEDICAID

## 2024-12-01 ENCOUNTER — APPOINTMENT (OUTPATIENT)
Dept: GENERAL RADIOLOGY | Age: 24
DRG: 720 | End: 2024-12-01
Payer: MEDICAID

## 2024-12-01 PROBLEM — D84.821 IMMUNOSUPPRESSED DUE TO CHEMOTHERAPY (HCC): Status: ACTIVE | Noted: 2024-12-01

## 2024-12-01 PROBLEM — R79.89 POSITIVE D DIMER: Status: ACTIVE | Noted: 2024-12-01

## 2024-12-01 PROBLEM — L98.491 SKIN ULCER OF PERINEUM, LIMITED TO BREAKDOWN OF SKIN (HCC): Status: ACTIVE | Noted: 2024-12-01

## 2024-12-01 PROBLEM — L73.9 FOLLICULITIS OF PERINEUM: Status: ACTIVE | Noted: 2024-12-01

## 2024-12-01 PROBLEM — R00.0 TACHYCARDIA: Status: ACTIVE | Noted: 2024-12-01

## 2024-12-01 PROBLEM — R79.82 CRP ELEVATED: Status: ACTIVE | Noted: 2024-12-01

## 2024-12-01 PROBLEM — L03.012: Status: ACTIVE | Noted: 2024-12-01

## 2024-12-01 PROBLEM — M79.642 LEFT HAND PAIN: Status: ACTIVE | Noted: 2024-12-01

## 2024-12-01 PROBLEM — D70.9 NEUTROPENIA (HCC): Status: ACTIVE | Noted: 2024-12-01

## 2024-12-01 PROBLEM — M32.9 SLE (SYSTEMIC LUPUS ERYTHEMATOSUS RELATED SYNDROME) (HCC): Status: ACTIVE | Noted: 2024-12-01

## 2024-12-01 PROBLEM — T45.1X5A IMMUNOSUPPRESSED DUE TO CHEMOTHERAPY (HCC): Status: ACTIVE | Noted: 2024-12-01

## 2024-12-01 PROBLEM — Z79.899 IMMUNOSUPPRESSED DUE TO CHEMOTHERAPY (HCC): Status: ACTIVE | Noted: 2024-12-01

## 2024-12-01 PROBLEM — N30.01 ACUTE CYSTITIS WITH HEMATURIA: Status: ACTIVE | Noted: 2024-12-01

## 2024-12-01 LAB
A1AT SERPL-MCNC: 296 MG/DL (ref 90–200)
AFP SERPL-MCNC: <1.8 UG/L
ALBUMIN SERPL-MCNC: 3.1 G/DL (ref 3.5–5.2)
ALBUMIN SERPL-MCNC: 3.7 G/DL (ref 3.5–5.2)
ALBUMIN/GLOB SERPL: 0.9 {RATIO} (ref 1–2.5)
ALBUMIN/GLOB SERPL: 1 {RATIO} (ref 1–2.5)
ALP SERPL-CCNC: 102 U/L (ref 35–104)
ALP SERPL-CCNC: 117 U/L (ref 35–104)
ALT SERPL-CCNC: 1187 U/L (ref 10–35)
ALT SERPL-CCNC: 862 U/L (ref 10–35)
AMPHET UR QL SCN: NEGATIVE
ANION GAP SERPL CALCULATED.3IONS-SCNC: 13 MMOL/L (ref 9–16)
AST SERPL-CCNC: 1480 U/L (ref 10–35)
AST SERPL-CCNC: 2017 U/L (ref 10–35)
BARBITURATES UR QL SCN: NEGATIVE
BASOPHILS # BLD: 0 K/UL (ref 0–0.2)
BASOPHILS NFR BLD: 0 % (ref 0–2)
BENZODIAZ UR QL: NEGATIVE
BILIRUB DIRECT SERPL-MCNC: 0.5 MG/DL (ref 0–0.2)
BILIRUB DIRECT SERPL-MCNC: 0.6 MG/DL (ref 0–0.2)
BILIRUB INDIRECT SERPL-MCNC: 0.3 MG/DL (ref 0–1)
BILIRUB INDIRECT SERPL-MCNC: 0.4 MG/DL (ref 0–1)
BILIRUB SERPL-MCNC: 0.8 MG/DL (ref 0–1.2)
BILIRUB SERPL-MCNC: 1 MG/DL (ref 0–1.2)
BUN SERPL-MCNC: 8 MG/DL (ref 6–20)
C3 SERPL-MCNC: 193 MG/DL (ref 90–180)
C4 SERPL-MCNC: 33 MG/DL (ref 10–40)
CALCIUM SERPL-MCNC: 8.3 MG/DL (ref 8.6–10.4)
CANNABINOIDS UR QL SCN: POSITIVE
CERULOPLASMIN SERPL-MCNC: 37 MG/DL (ref 16–45)
CHLORIDE SERPL-SCNC: 103 MMOL/L (ref 98–107)
CK SERPL-CCNC: 99 U/L (ref 26–192)
CO2 SERPL-SCNC: 19 MMOL/L (ref 20–31)
COCAINE UR QL SCN: NEGATIVE
CREAT SERPL-MCNC: 0.6 MG/DL (ref 0.6–0.9)
D DIMER PPP FEU-MCNC: 15.31 UG/ML FEU (ref 0–0.57)
EOSINOPHIL # BLD: 0.01 K/UL (ref 0–0.4)
EOSINOPHILS RELATIVE PERCENT: 1 % (ref 1–4)
ERYTHROCYTE [DISTWIDTH] IN BLOOD BY AUTOMATED COUNT: 14.2 % (ref 11.8–14.4)
FENTANYL UR QL: NEGATIVE
FERRITIN SERPL-MCNC: 5632 NG/ML (ref 15–150)
FOLATE SERPL-MCNC: 17.6 NG/ML (ref 4.8–24.2)
GFR, ESTIMATED: >90 ML/MIN/1.73M2
GGT SERPL-CCNC: 63 U/L (ref 5–36)
GLOBULIN SER CALC-MCNC: 3.5 G/DL
GLOBULIN SER CALC-MCNC: 3.8 G/DL
GLUCOSE SERPL-MCNC: 117 MG/DL (ref 74–99)
HAPTOGLOB SERPL-MCNC: 176 MG/DL (ref 30–200)
HCT VFR BLD AUTO: 26.6 % (ref 36.3–47.1)
HGB BLD-MCNC: 8.5 G/DL (ref 11.9–15.1)
HSV1 DNA SPEC QL NAA+PROBE: POSITIVE
HSV2 DNA SPEC QL NAA+PROBE: NEGATIVE
IMM GRANULOCYTES # BLD AUTO: 0.03 K/UL (ref 0–0.3)
IMM GRANULOCYTES NFR BLD: 4 %
IMM RETICS NFR: 5.1 % (ref 2.7–18.3)
INR PPP: 1.5
IRON SATN MFR SERPL: 13 % (ref 20–55)
IRON SERPL-MCNC: 37 UG/DL (ref 37–145)
LACTIC ACID, WHOLE BLOOD: 0.8 MMOL/L (ref 0.7–2.1)
LACTIC ACID, WHOLE BLOOD: 1 MMOL/L (ref 0.7–2.1)
LDH SERPL-CCNC: 1597 U/L (ref 135–214)
LYMPHOCYTES NFR BLD: 0.15 K/UL (ref 1–4.8)
LYMPHOCYTES RELATIVE PERCENT: 21 % (ref 24–44)
MCH RBC QN AUTO: 30.7 PG (ref 25.2–33.5)
MCHC RBC AUTO-ENTMCNC: 32 G/DL (ref 28.4–34.8)
MCV RBC AUTO: 96 FL (ref 82.6–102.9)
METHADONE UR QL: NEGATIVE
MICROORGANISM SPEC CULT: NORMAL
MONOCYTES NFR BLD: 0.01 K/UL (ref 0.1–0.8)
MONOCYTES NFR BLD: 1 % (ref 1–7)
MORPHOLOGY: NORMAL
MYOGLOBIN SERPL-MCNC: 27 NG/ML (ref 25–58)
NEUTROPHILS NFR BLD: 73 % (ref 36–66)
NEUTS SEG NFR BLD: 0.5 K/UL (ref 1.8–7.7)
NRBC BLD-RTO: 0 PER 100 WBC
OPIATES UR QL SCN: NEGATIVE
OXYCODONE UR QL SCN: POSITIVE
PCP UR QL SCN: NEGATIVE
PLATELET # BLD AUTO: ABNORMAL K/UL (ref 138–453)
PLATELET, FLUORESCENCE: ABNORMAL K/UL (ref 138–453)
POTASSIUM SERPL-SCNC: 3.9 MMOL/L (ref 3.7–5.3)
PROCALCITONIN SERPL-MCNC: 0.61 NG/ML (ref 0–0.09)
PROT SERPL-MCNC: 6.6 G/DL (ref 6.6–8.7)
PROT SERPL-MCNC: 7.5 G/DL (ref 6.6–8.7)
PROTHROMBIN TIME: 17.4 SEC (ref 11.7–14.9)
RBC # BLD AUTO: 2.77 M/UL (ref 3.95–5.11)
RETIC HEMOGLOBIN: 26.1 PG (ref 28.2–35.7)
RETICS # AUTO: 0.02 M/UL (ref 0.03–0.08)
RETICS/RBC NFR AUTO: 0.6 % (ref 0.5–1.9)
SERVICE CMNT-IMP: NORMAL
SODIUM SERPL-SCNC: 135 MMOL/L (ref 136–145)
SPECIMEN DESCRIPTION: ABNORMAL
SPECIMEN DESCRIPTION: NORMAL
TEST INFORMATION: ABNORMAL
TIBC SERPL-MCNC: 289 UG/DL (ref 250–450)
TSH SERPL DL<=0.05 MIU/L-ACNC: 0.54 UIU/ML (ref 0.27–4.2)
UNSATURATED IRON BINDING CAPACITY: 252 UG/DL (ref 112–347)
VIT B12 SERPL-MCNC: >2000 PG/ML (ref 232–1245)
WBC OTHER # BLD: 0.7 K/UL (ref 3.5–11.3)

## 2024-12-01 PROCEDURE — 83540 ASSAY OF IRON: CPT

## 2024-12-01 PROCEDURE — 80307 DRUG TEST PRSMV CHEM ANLYZR: CPT

## 2024-12-01 PROCEDURE — 82607 VITAMIN B-12: CPT

## 2024-12-01 PROCEDURE — 85045 AUTOMATED RETICULOCYTE COUNT: CPT

## 2024-12-01 PROCEDURE — 87075 CULTR BACTERIA EXCEPT BLOOD: CPT

## 2024-12-01 PROCEDURE — 6360000004 HC RX CONTRAST MEDICATION: Performed by: STUDENT IN AN ORGANIZED HEALTH CARE EDUCATION/TRAINING PROGRAM

## 2024-12-01 PROCEDURE — 99232 SBSQ HOSP IP/OBS MODERATE 35: CPT | Performed by: STUDENT IN AN ORGANIZED HEALTH CARE EDUCATION/TRAINING PROGRAM

## 2024-12-01 PROCEDURE — 71260 CT THORAX DX C+: CPT

## 2024-12-01 PROCEDURE — 80076 HEPATIC FUNCTION PANEL: CPT

## 2024-12-01 PROCEDURE — 85025 COMPLETE CBC W/AUTO DIFF WBC: CPT

## 2024-12-01 PROCEDURE — 82103 ALPHA-1-ANTITRYPSIN TOTAL: CPT

## 2024-12-01 PROCEDURE — 86038 ANTINUCLEAR ANTIBODIES: CPT

## 2024-12-01 PROCEDURE — 6370000000 HC RX 637 (ALT 250 FOR IP): Performed by: INTERNAL MEDICINE

## 2024-12-01 PROCEDURE — 84145 PROCALCITONIN (PCT): CPT

## 2024-12-01 PROCEDURE — 87185 SC STD ENZYME DETCJ PER NZM: CPT

## 2024-12-01 PROCEDURE — 2580000003 HC RX 258: Performed by: INTERNAL MEDICINE

## 2024-12-01 PROCEDURE — 6360000002 HC RX W HCPCS: Performed by: INTERNAL MEDICINE

## 2024-12-01 PROCEDURE — 84443 ASSAY THYROID STIM HORMONE: CPT

## 2024-12-01 PROCEDURE — 82105 ALPHA-FETOPROTEIN SERUM: CPT

## 2024-12-01 PROCEDURE — 87252 VIRUS INOCULATION TISSUE: CPT

## 2024-12-01 PROCEDURE — 86376 MICROSOMAL ANTIBODY EACH: CPT

## 2024-12-01 PROCEDURE — 87186 SC STD MICRODIL/AGAR DIL: CPT

## 2024-12-01 PROCEDURE — 2580000003 HC RX 258: Performed by: STUDENT IN AN ORGANIZED HEALTH CARE EDUCATION/TRAINING PROGRAM

## 2024-12-01 PROCEDURE — 76705 ECHO EXAM OF ABDOMEN: CPT

## 2024-12-01 PROCEDURE — 2060000000 HC ICU INTERMEDIATE R&B

## 2024-12-01 PROCEDURE — 82746 ASSAY OF FOLIC ACID SERUM: CPT

## 2024-12-01 PROCEDURE — 82390 ASSAY OF CERULOPLASMIN: CPT

## 2024-12-01 PROCEDURE — 99254 IP/OBS CNSLTJ NEW/EST MOD 60: CPT | Performed by: INTERNAL MEDICINE

## 2024-12-01 PROCEDURE — 86160 COMPLEMENT ANTIGEN: CPT

## 2024-12-01 PROCEDURE — 82977 ASSAY OF GGT: CPT

## 2024-12-01 PROCEDURE — 83550 IRON BINDING TEST: CPT

## 2024-12-01 PROCEDURE — 87076 CULTURE ANAEROBE IDENT EACH: CPT

## 2024-12-01 PROCEDURE — 83516 IMMUNOASSAY NONANTIBODY: CPT

## 2024-12-01 PROCEDURE — 87077 CULTURE AEROBIC IDENTIFY: CPT

## 2024-12-01 PROCEDURE — 85055 RETICULATED PLATELET ASSAY: CPT

## 2024-12-01 PROCEDURE — 36415 COLL VENOUS BLD VENIPUNCTURE: CPT

## 2024-12-01 PROCEDURE — 6360000002 HC RX W HCPCS: Performed by: STUDENT IN AN ORGANIZED HEALTH CARE EDUCATION/TRAINING PROGRAM

## 2024-12-01 PROCEDURE — 87205 SMEAR GRAM STAIN: CPT

## 2024-12-01 PROCEDURE — 85379 FIBRIN DEGRADATION QUANT: CPT

## 2024-12-01 PROCEDURE — 83010 ASSAY OF HAPTOGLOBIN QUANT: CPT

## 2024-12-01 PROCEDURE — 86225 DNA ANTIBODY NATIVE: CPT

## 2024-12-01 PROCEDURE — A4217 STERILE WATER/SALINE, 500 ML: HCPCS

## 2024-12-01 PROCEDURE — 73130 X-RAY EXAM OF HAND: CPT

## 2024-12-01 PROCEDURE — 6370000000 HC RX 637 (ALT 250 FOR IP): Performed by: STUDENT IN AN ORGANIZED HEALTH CARE EDUCATION/TRAINING PROGRAM

## 2024-12-01 PROCEDURE — 87070 CULTURE OTHR SPECIMN AEROBIC: CPT

## 2024-12-01 PROCEDURE — 2580000003 HC RX 258

## 2024-12-01 PROCEDURE — 83615 LACTATE (LD) (LDH) ENZYME: CPT

## 2024-12-01 PROCEDURE — 6370000000 HC RX 637 (ALT 250 FOR IP): Performed by: NURSE PRACTITIONER

## 2024-12-01 PROCEDURE — 80048 BASIC METABOLIC PNL TOTAL CA: CPT

## 2024-12-01 PROCEDURE — 83605 ASSAY OF LACTIC ACID: CPT

## 2024-12-01 PROCEDURE — 82728 ASSAY OF FERRITIN: CPT

## 2024-12-01 PROCEDURE — 85610 PROTHROMBIN TIME: CPT

## 2024-12-01 PROCEDURE — 87529 HSV DNA AMP PROBE: CPT

## 2024-12-01 RX ORDER — IOPAMIDOL 755 MG/ML
75 INJECTION, SOLUTION INTRAVASCULAR
Status: COMPLETED | OUTPATIENT
Start: 2024-12-01 | End: 2024-12-01

## 2024-12-01 RX ORDER — OXYCODONE AND ACETAMINOPHEN 5; 325 MG/1; MG/1
2 TABLET ORAL EVERY 4 HOURS PRN
Status: DISCONTINUED | OUTPATIENT
Start: 2024-12-01 | End: 2024-12-03

## 2024-12-01 RX ORDER — FAMOTIDINE 20 MG/1
20 TABLET, FILM COATED ORAL 2 TIMES DAILY
Status: DISCONTINUED | OUTPATIENT
Start: 2024-12-01 | End: 2024-12-10 | Stop reason: HOSPADM

## 2024-12-01 RX ORDER — PREDNISONE 10 MG/1
10 TABLET ORAL 2 TIMES DAILY
Status: DISCONTINUED | OUTPATIENT
Start: 2024-12-01 | End: 2024-12-04

## 2024-12-01 RX ORDER — OXYCODONE AND ACETAMINOPHEN 5; 325 MG/1; MG/1
1 TABLET ORAL EVERY 4 HOURS PRN
Status: DISCONTINUED | OUTPATIENT
Start: 2024-12-01 | End: 2024-12-03

## 2024-12-01 RX ORDER — PREDNISONE 10 MG/1
5 TABLET ORAL DAILY
Status: DISCONTINUED | OUTPATIENT
Start: 2024-12-01 | End: 2024-12-01

## 2024-12-01 RX ORDER — IBUPROFEN 600 MG/1
600 TABLET, FILM COATED ORAL EVERY 6 HOURS PRN
Status: DISCONTINUED | OUTPATIENT
Start: 2024-12-01 | End: 2024-12-10 | Stop reason: HOSPADM

## 2024-12-01 RX ADMIN — IBUPROFEN 600 MG: 600 TABLET, FILM COATED ORAL at 03:03

## 2024-12-01 RX ADMIN — IOPAMIDOL 75 ML: 755 INJECTION, SOLUTION INTRAVENOUS at 12:37

## 2024-12-01 RX ADMIN — SODIUM CHLORIDE: 900 IRRIGANT IRRIGATION at 23:19

## 2024-12-01 RX ADMIN — Medication 15 ML: at 13:58

## 2024-12-01 RX ADMIN — CEFEPIME 2000 MG: 2 INJECTION, POWDER, FOR SOLUTION INTRAVENOUS at 01:04

## 2024-12-01 RX ADMIN — FAMOTIDINE 20 MG: 20 TABLET, FILM COATED ORAL at 20:43

## 2024-12-01 RX ADMIN — PREDNISONE 5 MG: 10 TABLET ORAL at 10:58

## 2024-12-01 RX ADMIN — MICONAZOLE NITRATE: 20 CREAM TOPICAL at 20:45

## 2024-12-01 RX ADMIN — HYDROCORTISONE SODIUM SUCCINATE 50 MG: 100 INJECTION, POWDER, FOR SOLUTION INTRAMUSCULAR; INTRAVENOUS at 06:39

## 2024-12-01 RX ADMIN — FAMOTIDINE 20 MG: 20 TABLET, FILM COATED ORAL at 09:09

## 2024-12-01 RX ADMIN — PREDNISONE 10 MG: 10 TABLET ORAL at 20:43

## 2024-12-01 RX ADMIN — LIDOCAINE HYDROCHLORIDE: 40 SOLUTION TOPICAL at 16:14

## 2024-12-01 RX ADMIN — LIDOCAINE HYDROCHLORIDE: 40 SOLUTION TOPICAL at 05:17

## 2024-12-01 RX ADMIN — SODIUM CHLORIDE 1500 MG: 9 INJECTION, SOLUTION INTRAVENOUS at 09:12

## 2024-12-01 RX ADMIN — METRONIDAZOLE 500 MG: 500 INJECTION, SOLUTION INTRAVENOUS at 13:57

## 2024-12-01 RX ADMIN — ENOXAPARIN SODIUM 30 MG: 100 INJECTION SUBCUTANEOUS at 09:09

## 2024-12-01 RX ADMIN — MICONAZOLE NITRATE: 20 CREAM TOPICAL at 10:58

## 2024-12-01 RX ADMIN — ACYCLOVIR SODIUM 550 MG: 50 INJECTION, SOLUTION INTRAVENOUS at 11:00

## 2024-12-01 RX ADMIN — CLOTRIMAZOLE: 10 CREAM VAGINAL at 02:14

## 2024-12-01 RX ADMIN — Medication 15 ML: at 09:07

## 2024-12-01 RX ADMIN — SODIUM CHLORIDE: 900 IRRIGANT IRRIGATION at 13:57

## 2024-12-01 RX ADMIN — ACYCLOVIR SODIUM 750 MG: 50 INJECTION, SOLUTION INTRAVENOUS at 03:05

## 2024-12-01 RX ADMIN — SODIUM CHLORIDE, PRESERVATIVE FREE 10 ML: 5 INJECTION INTRAVENOUS at 20:43

## 2024-12-01 RX ADMIN — LIDOCAINE HYDROCHLORIDE: 40 SOLUTION TOPICAL at 02:14

## 2024-12-01 RX ADMIN — CEFEPIME 2000 MG: 2 INJECTION, POWDER, FOR SOLUTION INTRAVENOUS at 09:16

## 2024-12-01 RX ADMIN — ACETAMINOPHEN 650 MG: 325 TABLET ORAL at 23:33

## 2024-12-01 RX ADMIN — SODIUM CHLORIDE: 900 IRRIGANT IRRIGATION at 11:20

## 2024-12-01 RX ADMIN — ACYCLOVIR SODIUM 550 MG: 50 INJECTION, SOLUTION INTRAVENOUS at 18:01

## 2024-12-01 RX ADMIN — METRONIDAZOLE 500 MG: 500 INJECTION, SOLUTION INTRAVENOUS at 06:45

## 2024-12-01 RX ADMIN — CEFEPIME 2000 MG: 2 INJECTION, POWDER, FOR SOLUTION INTRAVENOUS at 23:39

## 2024-12-01 RX ADMIN — SODIUM CHLORIDE 1500 MG: 9 INJECTION, SOLUTION INTRAVENOUS at 23:44

## 2024-12-01 RX ADMIN — SODIUM CHLORIDE: 900 IRRIGANT IRRIGATION at 20:46

## 2024-12-01 RX ADMIN — CLOTRIMAZOLE: 10 CREAM VAGINAL at 19:28

## 2024-12-01 RX ADMIN — CLOTRIMAZOLE: 10 CREAM VAGINAL at 09:09

## 2024-12-01 RX ADMIN — Medication 15 ML: at 23:19

## 2024-12-01 RX ADMIN — ENOXAPARIN SODIUM 30 MG: 100 INJECTION SUBCUTANEOUS at 20:43

## 2024-12-01 RX ADMIN — ACETAMINOPHEN 650 MG: 325 TABLET ORAL at 01:31

## 2024-12-01 ASSESSMENT — PAIN SCALES - GENERAL
PAINLEVEL_OUTOF10: 0
PAINLEVEL_OUTOF10: 6
PAINLEVEL_OUTOF10: 0
PAINLEVEL_OUTOF10: 0
PAINLEVEL_OUTOF10: 10
PAINLEVEL_OUTOF10: 3
PAINLEVEL_OUTOF10: 0

## 2024-12-01 ASSESSMENT — PAIN DESCRIPTION - LOCATION
LOCATION: MOUTH
LOCATION: MOUTH
LOCATION: VAGINA

## 2024-12-01 ASSESSMENT — PAIN DESCRIPTION - ORIENTATION
ORIENTATION: ANTERIOR
ORIENTATION: MID
ORIENTATION: ANTERIOR

## 2024-12-01 ASSESSMENT — PAIN DESCRIPTION - DESCRIPTORS
DESCRIPTORS: ACHING
DESCRIPTORS: ACHING
DESCRIPTORS: BURNING

## 2024-12-01 NOTE — H&P
gynecology reevaluation and recommended Diflucan, acyclovir, terbinafine topical and empiric antibiotics pending culture results.  Lidocaine as needed for vaginal discomfort.  Previously recommended for clinical exam under anesthesia 12/04.    Paronychia status post failed outpatient treatment.  Discussed with Dr. Piña.  Does not think likely source of sepsis.  Will evaluate in a.m..  Possible UTI empiric antibiotics as above  Acute hepatitis/transaminitis.  Patient denies significant binge drinking.  Prior labs with unremarkable LFTs.  Previously documented vaccination to hepatitis A and B.  Check acute hepatitis panel.  Check serologies to evaluate possible vasculitis/acute autoimmune process.  Check IgE with recent antibiotics but less likely with AST more than ALT elevation.  Check right upper quadrant ultrasound.   trend labs with consideration for GI evaluation.  Consider surgery evaluation if concern for cholecystitis-less likely with normal alkaline phosphatase  Mucositis.  Start Magic mouthwash.    Suspected SIRS/sepsis.  Empiric antibiotics for now pending culture results, and ID input.  Labs w/ Neutropenia with vacuolated neutrophils/coarse granular PMNs strongly suggest bacterial sepsis.  Empiric antibiotics pending cultures.     Check further labs to evaluate nutritional deficiencies.  Consider heme eval if further concern  Sinus tachycardia/orthostasis with borderline hypotension.  Possible component of adrenal insufficiency versus related to SIRS/sepsis versus dehydration.  Status post IV fluid bolus.  Continued maintenance.  Status post stress dose steroids.  Check thyroid studies  ANCA negative GPA/systemic lupus on chronic immunosuppression.  UA with + blood /2+protein.  as above pending serologies.  Pending rheumatology evaluation.  Recent serologies have all been stable.  Prior ANCA negative.    History of PE 2020.  Consider CT angio to evaluate with pleurisy/shortness of

## 2024-12-01 NOTE — ED NOTES
The following labs were labeled with appropriate pt sticker and tubed to lab:     [] Blue     [] Lavender   [] on ice  [x] Green/yellow  [x] Green/black [x] on ice  [] Grey  [] on ice  [x] Yellow  [] Red  [] Pink  [] Type/ Screen  [] ABG  [] VBG    [] COVID-19 swab    [] Rapid  [] PCR  [] Flu swab  [] Peds Viral Panel     [] Urine Sample  [] Fecal Sample  [] Pelvic Cultures  [] Blood Cultures  [] X 2  [] STREP Cultures  [] Wound Cultures

## 2024-12-01 NOTE — ED PROVIDER NOTES
[facility]     Emergency Department     Faculty Attestation    I performed a history and physical examination of the patient and discussed management with the resident. I have reviewed and agree with the resident’s findings including all diagnostic interpretations, and treatment plans as written at the time of my review. Any areas of disagreement are noted on the chart. I was personally present for the key portions of any procedures. I have documented in the chart those procedures where I was not present during the key portions. For Physician Assistant/ Nurse Practitioner cases/documentation I have personally evaluated this patient and have completed at least one if not all key elements of the E/M (history, physical exam, and MDM). Additional findings are as noted.    PtName: Patricia Bansal  MRN: [id number]  Birthdate 2000  Date of evaluation: 11/30/24  Note Started: 3:24 PM EST    Primary Care Physician: Ángela Bernstein MD        History: This is a 24 y.o. female who presents to the Emergency Department with complaint of finger pain.  Patient presents emerged her complaining of index finger pain for she was seen recently and placed on antibiotics without any improvement.  She has complaints of some pain and swelling in the labia.  She has complaints of fever and sweats.    Physical:   height is 1.626 m (5' 4\") and weight is 108.9 kg (240 lb). Her oral temperature is 103 °F (39.4 °C) (abnormal). Her blood pressure is 108/69 and her pulse is 131 (abnormal). Her respiration is 20 and oxygen saturation is 97%.  Patient is tachycardic, no respiratory distress noted, abdomen is soft, patient does have significant swelling to the left long finger consistent with paronychia.  Pelvic exam performed by the resident.  Pelvic semination does show multiple vesicular lesions in the pelvic vaginal region area    Impression: Paronychia, HSV    Plan: Antipyretic, CBC, CMP, 
candida.    Will d/c vaginitis probe, still do vaginal exam checking for tenderness any physical signs infection.  [KB]   1559 Patient may be possible admit, will be started on fluid bolus at this time. Likely will need an I&D of finger. Will obtain cultures before starting on antibiotics. [KB]   1615 Vaginal and pelvic exam completed. Patient has a significant amount of raised non erythematous, non vesicular lesions along labia majora and labia minora. Not visualized in vaginal canal- significant clear discharge noted at cervical os. Attending asked to look at. Will  consult OBGYN for further assessment.  [KB]   1620 WBC Count 1.2  Started on Zosyn due to immunocompromised, pos SIRS criteria    Patient reports she has hx of lupus and Wegener's disease- was on prednisone and plaquenil until she ran out of medication. Reports ran out of medications a few weeks ago- unsure when was last date she took medications.   [KB]   1630 Spoke with GYN resident and Erin Mtz DO. OBGYN team will see and assess.     Due to finger lesion and concern may be Herpetic Kayla, current OB history will start patient on acyclovir.    [KB]   1635 OBGYN assessed. Believe lesions are possible bacterial folliculitis due to shaving. Recommend clindamycin and topical terbinafine. Lidocaine for pain. Is appropriate to continue dosage acyclovir.  [KB]   1730 Urinalysis positive for UTI with moderate leukocytes, positive nitrates. Spoke with patient. Reports has been taking Bactrim and Keflex the past 3 days as prescribed. With no signs of improvement to finger as well as positive UTI failed trial of outpatient antibiotics. Due to patients low WBC, hx of immunocomprised conditions will plan for an ID consult as well admit to IM.  [KB]   8773 Spoke with Dr. Varela and OBGYN residency team. Ok with any ID recommendations and that ID may take primary for antibiotic recommendations at this time.  [KB]   1800 Updated patient and mother 
possible for a visit      Danielle Wills MD  [address], [address]  [facility] [address]  [phone]    Schedule an appointment as soon as possible for a visit in 1 month(s)      Ángela Bernstein MD  [address] [facility] [id number] [phone]    Schedule an appointment as soon as possible for a visit in 1 week(s)  for follow up      DISCHARGE MEDICATIONS:  Discharge Medication List as of 12/10/2024  3:58 PM        START taking these medications    Details   !! acyclovir (ZOVIRAX) 800 MG tablet Take 1 tablet by mouth 3 times daily for 14 days 14 days till 12/24/24  then stop and start  acyclovir 400 mg po 2 x per day long term suppression, Disp-42 tablet, R-0Normal      !! acyclovir (ZOVIRAX) 400 MG tablet Take 1 tablet by mouth 2 times daily Start 11/25/24 and keep long term to suppress the herpes from coming back-, Disp-60 tablet, R-11Normal      neomycin-bacitracin-polymyxin (NEOSPORIN) 5-400-5000 ointment Apply topically 4 times daily., Disp-5 g, R-3, Normal      clotrimazole (LOTRIMIN) 1 % vaginal cream Place vaginally 2 times daily.OTC      docusate sodium (COLACE, DULCOLAX) 100 MG CAPS Take 200 mg by mouth 2 times daily, Disp-40 capsule, R-0Normal      miconazole (MICOTIN) 2 % cream Apply topically 2 times daily., OTC      polyethylene glycol (GLYCOLAX) 17 g packet Take 1 packet by mouth daily as needed for ConstipationOTC      oxyCODONE (ROXICODONE) 5 MG immediate release tablet Take 1 tablet by mouth every 6 hours as needed for Pain for up to 3 days. Max Daily Amount: 20 mg, Disp-12 tablet, R-0Print      lidocaine (XYLOCAINE) 5 % ointment Apply topically as needed., Disp-50 g, R-1, Normal       !! - Potential duplicate medications found. Please discuss with provider.          Gloria Winters MD  PGY-1 Transitional Year Resident  Emergency Medicine     (Please note that portions of this note were completed with a voice recognition program.  Efforts were made to edit the dictations but occasionally

## 2024-12-01 NOTE — ED NOTES
Report received from Sharona ERNANDEZ, all questions answered. Pt is resting in bed comfortably, NAD noted. Pt placed on SpO2 monitor. Care ongoing

## 2024-12-01 NOTE — ED NOTES
ED to inpatient nurses report    Hand pain    Present to ED from home  LOC: alert and orientated to name, place, date  Vital signs   Vitals:    11/30/24 1427 11/30/24 1428 11/30/24 1745   BP: 108/69     Pulse: (!) 131  (!) 105   Resp:  20    Temp: (!) 103 °F (39.4 °C)  100.3 °F (37.9 °C)   TempSrc: Oral  Oral   SpO2: 97%     Weight: 108.9 kg (240 lb)     Height: 1.626 m (5' 4\")        Oxygen Baseline RA  Current needs required antibiotics  LDAs:   Peripheral IV 11/30/24 (Active)     Mobility: Independent  Pending ED orders:   Present condition: Stable  Code Status: [unfilled]   Consults:  []  Hospitalist  Completed  [] yes [] no  []  Medicine  Completed  [] yes [] No  []  Cardiology  Completed  [] yes [] No  []  GI   Completed  [] yes [] No  []  Neurology  Completed  [] yes [] No  []  Nephrology Completed  [] yes [] No  []  Vascular  Completed  [] yes [] No   []  Surgery  Completed  [] yes [] No   []  Urology  Completed  [] yes [] No   []  Plastics  Completed  [] yes [] No   []  ENT  Completed  [] yes [] No   []  Other OB   Completed  [x] yes [] No  Pertinent event(s) pt is immunosuppressive  Pertinent event(s) Pt to emergency department, c/o painful cutaneous infection of the groin.  Patient reports that she has been treated October she developed a significant painful sores and erosions over the groin, and self discontinued the 5-FU.  On Wednesday she reports shaving the area, and developed a painful vesicular rash over the skin and has been extruding a yellow purulent discharge.  She also notably has a infection of the finger, and a history of Wegener's granulomatosis controlled with immunosuppressive therapy.   Electronically signed by Sharona MCGRAW RN on 11/30/2024 at 7:13 PM

## 2024-12-01 NOTE — CARE COORDINATION
Case Management Assessment  Initial Evaluation    Date/Time of Evaluation: 12/1/2024 4:38 PM  Assessment Completed by: Camille Brown RN    If patient is discharged prior to next notation, then this note serves as note for discharge by case management.    Patient Name: Patricia Bansal                   YOB: 2000  Diagnosis: Cellulitis [L03.90]  Left hand pain [M79.642]  Folliculitis of perineum [L73.9]  Acute cystitis with hematuria [N30.01]  Neutropenia, unspecified type (HCC) [D70.9]                   Date / Time: 11/30/2024  2:43 PM    Patient Admission Status: Inpatient   Readmission Risk (Low < 19, Mod (19-27), High > 27): Readmission Risk Score: 11.1    Current PCP:   PCP verified by CM? (P) No (does not have PCP. list provided)    Chart Reviewed: Yes      History Provided by: (P) Patient  Patient Orientation: (P) Alert and Oriented    Patient Cognition: (P) Alert    Hospitalization in the last 30 days (Readmission):  No    If yes, Readmission Assessment in CM Navigator will be completed.    Advance Directives:      Code Status: Full Code   Patient's Primary Decision Maker is: (P) Legal Next of Kin      Discharge Planning:    Patient lives with: (P) Alone Type of Home: (P) Apartment  Primary Care Giver: (P) Self  Patient Support Systems include: (P) Parent   Current Financial resources: (P) Medicaid  Current community resources:    Current services prior to admission: (P) None            Current DME:              Type of Home Care services:  (P) None    ADLS  Prior functional level: (P) Independent in ADLs/IADLs  Current functional level: (P) Independent in ADLs/IADLs    PT AM-PAC:   /24  OT AM-PAC:   /24    Family can provide assistance at DC: (P) No  Would you like Case Management to discuss the discharge plan with any other family members/significant others, and if so, who? (P) No  Plans to Return to Present Housing: (P) Yes  Other Identified Issues/Barriers to RETURNING to current housing:

## 2024-12-02 ENCOUNTER — APPOINTMENT (OUTPATIENT)
Dept: VASCULAR LAB | Age: 24
DRG: 720 | End: 2024-12-02
Attending: STUDENT IN AN ORGANIZED HEALTH CARE EDUCATION/TRAINING PROGRAM
Payer: MEDICAID

## 2024-12-02 ENCOUNTER — APPOINTMENT (OUTPATIENT)
Age: 24
DRG: 720 | End: 2024-12-02
Attending: INTERNAL MEDICINE
Payer: MEDICAID

## 2024-12-02 PROBLEM — N30.01 ACUTE CYSTITIS WITH HEMATURIA: Status: RESOLVED | Noted: 2024-12-01 | Resolved: 2024-12-02

## 2024-12-02 PROBLEM — D61.818 PANCYTOPENIA (HCC): Status: ACTIVE | Noted: 2024-12-02

## 2024-12-02 PROBLEM — R79.89 POSITIVE D DIMER: Status: ACTIVE | Noted: 2024-12-02

## 2024-12-02 PROBLEM — M79.642 LEFT HAND PAIN: Status: RESOLVED | Noted: 2024-12-01 | Resolved: 2024-12-02

## 2024-12-02 PROBLEM — D70.9 NEUTROPENIA (HCC): Status: RESOLVED | Noted: 2024-12-01 | Resolved: 2024-12-02

## 2024-12-02 PROBLEM — B00.9 HERPES SIMPLEX VIRUS (HSV) INFECTION: Status: ACTIVE | Noted: 2024-12-02

## 2024-12-02 LAB
ALBUMIN SERPL-MCNC: 3.2 G/DL (ref 3.5–5.2)
ALBUMIN/GLOB SERPL: 1.1 {RATIO} (ref 1–2.5)
ALP SERPL-CCNC: 107 U/L (ref 35–104)
ALT SERPL-CCNC: 924 U/L (ref 10–35)
ANA SER QL IA: NEGATIVE
ANA SER QL IA: NEGATIVE
ANION GAP SERPL CALCULATED.3IONS-SCNC: 11 MMOL/L (ref 9–16)
AST SERPL-CCNC: 1279 U/L (ref 10–35)
BASOPHILS # BLD: 0 K/UL (ref 0–0.2)
BASOPHILS NFR BLD: 0 % (ref 0–2)
BILIRUB DIRECT SERPL-MCNC: 0.4 MG/DL (ref 0–0.2)
BILIRUB INDIRECT SERPL-MCNC: 0.2 MG/DL (ref 0–1)
BILIRUB SERPL-MCNC: 0.6 MG/DL (ref 0–1.2)
BUN SERPL-MCNC: 6 MG/DL (ref 6–20)
CALCIUM SERPL-MCNC: 7.8 MG/DL (ref 8.6–10.4)
CELLS COUNTED: 25
CHLORIDE SERPL-SCNC: 102 MMOL/L (ref 98–107)
CO2 SERPL-SCNC: 22 MMOL/L (ref 20–31)
CREAT SERPL-MCNC: 0.5 MG/DL (ref 0.6–0.9)
CRP SERPL HS-MCNC: 267 MG/L (ref 0–5)
DSDNA IGG SER QL IA: 1.2 IU/ML
DSDNA IGG SER QL IA: 1.2 IU/ML
EKG ATRIAL RATE: 119 BPM
EKG P AXIS: 38 DEGREES
EKG P-R INTERVAL: 128 MS
EKG Q-T INTERVAL: 284 MS
EKG QRS DURATION: 72 MS
EKG QTC CALCULATION (BAZETT): 399 MS
EKG R AXIS: 52 DEGREES
EKG T AXIS: 41 DEGREES
EKG VENTRICULAR RATE: 119 BPM
EOSINOPHIL # BLD: 0 K/UL (ref 0–0.4)
EOSINOPHILS RELATIVE PERCENT: 0 % (ref 1–4)
ERYTHROCYTE [DISTWIDTH] IN BLOOD BY AUTOMATED COUNT: 14.4 % (ref 11.8–14.4)
GFR, ESTIMATED: >90 ML/MIN/1.73M2
GLOBULIN SER CALC-MCNC: 3 G/DL
GLUCOSE SERPL-MCNC: 114 MG/DL (ref 74–99)
HCT VFR BLD AUTO: 24.4 % (ref 36.3–47.1)
HGB BLD-MCNC: 7.8 G/DL (ref 11.9–15.1)
IMM GRANULOCYTES # BLD AUTO: 0 K/UL (ref 0–0.3)
IMM GRANULOCYTES NFR BLD: 0 %
IMM RETICS NFR: 6 % (ref 2.7–18.3)
INR PPP: 1.4
LYMPHOCYTES NFR BLD: 0.17 K/UL (ref 1–4.8)
LYMPHOCYTES RELATIVE PERCENT: 28 % (ref 24–44)
MCH RBC QN AUTO: 30.6 PG (ref 25.2–33.5)
MCHC RBC AUTO-ENTMCNC: 32 G/DL (ref 28.4–34.8)
MCV RBC AUTO: 95.7 FL (ref 82.6–102.9)
MICROORGANISM SPEC CULT: NORMAL
MICROORGANISM/AGENT SPEC: NORMAL
MICROORGANISM/AGENT SPEC: NORMAL
MITOCHONDRIA M2 IGG SER-ACNC: 1.1 U/ML (ref 0–4)
MONOCYTES NFR BLD: 0.02 K/UL (ref 0.1–0.8)
MONOCYTES NFR BLD: 4 % (ref 1–7)
MORPHOLOGY: ABNORMAL
NEUTROPHILS NFR BLD: 68 % (ref 36–66)
NEUTS SEG NFR BLD: 0.41 K/UL (ref 1.8–7.7)
NRBC BLD-RTO: 0 PER 100 WBC
NUCLEAR IGG SER IA-RTO: 0.3 U/ML
NUCLEAR IGG SER IA-RTO: 0.4 U/ML
PLATELET # BLD AUTO: ABNORMAL K/UL (ref 138–453)
PLATELET, FLUORESCENCE: 96 K/UL (ref 138–453)
PLATELETS.RETICULATED NFR BLD AUTO: 3.6 % (ref 1.1–10.3)
POTASSIUM SERPL-SCNC: 3.6 MMOL/L (ref 3.7–5.3)
PROT SERPL-MCNC: 6.2 G/DL (ref 6.6–8.7)
PROTHROMBIN TIME: 16.5 SEC (ref 11.7–14.9)
RBC # BLD AUTO: 2.55 M/UL (ref 3.95–5.11)
RETIC HEMOGLOBIN: 30.6 PG (ref 28.2–35.7)
RETICS # AUTO: 0.01 M/UL (ref 0.03–0.08)
RETICS/RBC NFR AUTO: 0.4 % (ref 0.5–1.9)
SERVICE CMNT-IMP: NORMAL
SMOOTH MUSCLE ANTIBODY: 18 UNITS (ref 0–19)
SODIUM SERPL-SCNC: 135 MMOL/L (ref 136–145)
SPECIMEN DESCRIPTION: NORMAL
SPECIMEN DESCRIPTION: NORMAL
VANCOMYCIN SERPL-MCNC: 12.3 UG/ML (ref 5–40)
WBC OTHER # BLD: 0.6 K/UL (ref 3.5–11.3)

## 2024-12-02 PROCEDURE — 2580000003 HC RX 258

## 2024-12-02 PROCEDURE — 6360000002 HC RX W HCPCS: Performed by: INTERNAL MEDICINE

## 2024-12-02 PROCEDURE — 2580000003 HC RX 258: Performed by: STUDENT IN AN ORGANIZED HEALTH CARE EDUCATION/TRAINING PROGRAM

## 2024-12-02 PROCEDURE — 2580000003 HC RX 258: Performed by: INTERNAL MEDICINE

## 2024-12-02 PROCEDURE — 6370000000 HC RX 637 (ALT 250 FOR IP): Performed by: STUDENT IN AN ORGANIZED HEALTH CARE EDUCATION/TRAINING PROGRAM

## 2024-12-02 PROCEDURE — 85045 AUTOMATED RETICULOCYTE COUNT: CPT

## 2024-12-02 PROCEDURE — 99233 SBSQ HOSP IP/OBS HIGH 50: CPT | Performed by: INTERNAL MEDICINE

## 2024-12-02 PROCEDURE — 82525 ASSAY OF COPPER: CPT

## 2024-12-02 PROCEDURE — 99255 IP/OBS CONSLTJ NEW/EST HI 80: CPT | Performed by: INTERNAL MEDICINE

## 2024-12-02 PROCEDURE — 85055 RETICULATED PLATELET ASSAY: CPT

## 2024-12-02 PROCEDURE — 6370000000 HC RX 637 (ALT 250 FOR IP)

## 2024-12-02 PROCEDURE — 84630 ASSAY OF ZINC: CPT

## 2024-12-02 PROCEDURE — 6370000000 HC RX 637 (ALT 250 FOR IP): Performed by: INTERNAL MEDICINE

## 2024-12-02 PROCEDURE — A4217 STERILE WATER/SALINE, 500 ML: HCPCS

## 2024-12-02 PROCEDURE — 36415 COLL VENOUS BLD VENIPUNCTURE: CPT

## 2024-12-02 PROCEDURE — 85610 PROTHROMBIN TIME: CPT

## 2024-12-02 PROCEDURE — 85025 COMPLETE CBC W/AUTO DIFF WBC: CPT

## 2024-12-02 PROCEDURE — 80076 HEPATIC FUNCTION PANEL: CPT

## 2024-12-02 PROCEDURE — 86140 C-REACTIVE PROTEIN: CPT

## 2024-12-02 PROCEDURE — 80202 ASSAY OF VANCOMYCIN: CPT

## 2024-12-02 PROCEDURE — 80048 BASIC METABOLIC PNL TOTAL CA: CPT

## 2024-12-02 PROCEDURE — 93010 ELECTROCARDIOGRAM REPORT: CPT | Performed by: INTERNAL MEDICINE

## 2024-12-02 PROCEDURE — 94640 AIRWAY INHALATION TREATMENT: CPT

## 2024-12-02 PROCEDURE — 88185 FLOWCYTOMETRY/TC ADD-ON: CPT

## 2024-12-02 PROCEDURE — 88184 FLOWCYTOMETRY/ TC 1 MARKER: CPT

## 2024-12-02 PROCEDURE — 2060000000 HC ICU INTERMEDIATE R&B

## 2024-12-02 PROCEDURE — 6360000002 HC RX W HCPCS: Performed by: STUDENT IN AN ORGANIZED HEALTH CARE EDUCATION/TRAINING PROGRAM

## 2024-12-02 PROCEDURE — 93970 EXTREMITY STUDY: CPT

## 2024-12-02 PROCEDURE — 99232 SBSQ HOSP IP/OBS MODERATE 35: CPT | Performed by: STUDENT IN AN ORGANIZED HEALTH CARE EDUCATION/TRAINING PROGRAM

## 2024-12-02 RX ORDER — POTASSIUM CHLORIDE 1500 MG/1
40 TABLET, EXTENDED RELEASE ORAL ONCE
Status: COMPLETED | OUTPATIENT
Start: 2024-12-02 | End: 2024-12-02

## 2024-12-02 RX ORDER — IPRATROPIUM BROMIDE AND ALBUTEROL SULFATE 2.5; .5 MG/3ML; MG/3ML
1 SOLUTION RESPIRATORY (INHALATION)
Status: DISCONTINUED | OUTPATIENT
Start: 2024-12-02 | End: 2024-12-02

## 2024-12-02 RX ORDER — ALBUTEROL SULFATE 90 UG/1
2 INHALANT RESPIRATORY (INHALATION) EVERY 6 HOURS PRN
Status: DISCONTINUED | OUTPATIENT
Start: 2024-12-02 | End: 2024-12-10 | Stop reason: HOSPADM

## 2024-12-02 RX ADMIN — CLOTRIMAZOLE: 10 CREAM VAGINAL at 08:29

## 2024-12-02 RX ADMIN — Medication 15 ML: at 14:24

## 2024-12-02 RX ADMIN — SODIUM CHLORIDE: 900 IRRIGANT IRRIGATION at 02:36

## 2024-12-02 RX ADMIN — SODIUM CHLORIDE: 900 IRRIGANT IRRIGATION at 14:21

## 2024-12-02 RX ADMIN — ACYCLOVIR SODIUM 550 MG: 50 INJECTION, SOLUTION INTRAVENOUS at 18:27

## 2024-12-02 RX ADMIN — PREDNISONE 10 MG: 10 TABLET ORAL at 08:29

## 2024-12-02 RX ADMIN — FAMOTIDINE 20 MG: 20 TABLET, FILM COATED ORAL at 08:29

## 2024-12-02 RX ADMIN — SODIUM CHLORIDE: 900 IRRIGANT IRRIGATION at 05:47

## 2024-12-02 RX ADMIN — Medication 15 ML: at 09:35

## 2024-12-02 RX ADMIN — SODIUM CHLORIDE: 9 INJECTION, SOLUTION INTRAVENOUS at 02:40

## 2024-12-02 RX ADMIN — SODIUM CHLORIDE 1500 MG: 9 INJECTION, SOLUTION INTRAVENOUS at 18:30

## 2024-12-02 RX ADMIN — SODIUM CHLORIDE, PRESERVATIVE FREE 10 ML: 5 INJECTION INTRAVENOUS at 21:24

## 2024-12-02 RX ADMIN — PREDNISONE 10 MG: 10 TABLET ORAL at 21:21

## 2024-12-02 RX ADMIN — ENOXAPARIN SODIUM 30 MG: 100 INJECTION SUBCUTANEOUS at 08:28

## 2024-12-02 RX ADMIN — SODIUM CHLORIDE: 9 INJECTION, SOLUTION INTRAVENOUS at 14:25

## 2024-12-02 RX ADMIN — LIDOCAINE HYDROCHLORIDE: 40 SOLUTION TOPICAL at 15:39

## 2024-12-02 RX ADMIN — OXYCODONE HYDROCHLORIDE AND ACETAMINOPHEN 1 TABLET: 5; 325 TABLET ORAL at 03:15

## 2024-12-02 RX ADMIN — CEFEPIME 2000 MG: 2 INJECTION, POWDER, FOR SOLUTION INTRAVENOUS at 11:51

## 2024-12-02 RX ADMIN — SODIUM CHLORIDE 1500 MG: 9 INJECTION, SOLUTION INTRAVENOUS at 10:56

## 2024-12-02 RX ADMIN — CLOTRIMAZOLE: 10 CREAM VAGINAL at 21:22

## 2024-12-02 RX ADMIN — POTASSIUM CHLORIDE 40 MEQ: 1500 TABLET, EXTENDED RELEASE ORAL at 10:46

## 2024-12-02 RX ADMIN — MICONAZOLE NITRATE: 20 CREAM TOPICAL at 08:29

## 2024-12-02 RX ADMIN — ACYCLOVIR SODIUM 550 MG: 50 INJECTION, SOLUTION INTRAVENOUS at 10:39

## 2024-12-02 RX ADMIN — Medication 15 ML: at 21:22

## 2024-12-02 RX ADMIN — SODIUM CHLORIDE: 900 IRRIGANT IRRIGATION at 17:18

## 2024-12-02 RX ADMIN — ENOXAPARIN SODIUM 30 MG: 100 INJECTION SUBCUTANEOUS at 21:21

## 2024-12-02 RX ADMIN — ACYCLOVIR SODIUM 550 MG: 50 INJECTION, SOLUTION INTRAVENOUS at 02:27

## 2024-12-02 RX ADMIN — FAMOTIDINE 20 MG: 20 TABLET, FILM COATED ORAL at 21:21

## 2024-12-02 RX ADMIN — SODIUM CHLORIDE, PRESERVATIVE FREE 10 ML: 5 INJECTION INTRAVENOUS at 08:30

## 2024-12-02 RX ADMIN — MICONAZOLE NITRATE: 20 CREAM TOPICAL at 21:22

## 2024-12-02 RX ADMIN — SODIUM CHLORIDE: 900 IRRIGANT IRRIGATION at 21:24

## 2024-12-02 RX ADMIN — IPRATROPIUM BROMIDE AND ALBUTEROL SULFATE 1 DOSE: 2.5; .5 SOLUTION RESPIRATORY (INHALATION) at 15:00

## 2024-12-02 RX ADMIN — SODIUM CHLORIDE: 900 IRRIGANT IRRIGATION at 10:31

## 2024-12-02 ASSESSMENT — PAIN DESCRIPTION - LOCATION: LOCATION: PERINEUM;RECTUM

## 2024-12-02 ASSESSMENT — PAIN SCALES - GENERAL
PAINLEVEL_OUTOF10: 3
PAINLEVEL_OUTOF10: 5

## 2024-12-02 ASSESSMENT — PAIN DESCRIPTION - DESCRIPTORS: DESCRIPTORS: ACHING;DISCOMFORT

## 2024-12-02 ASSESSMENT — PAIN DESCRIPTION - ORIENTATION: ORIENTATION: MID;POSTERIOR;OUTER

## 2024-12-02 NOTE — PLAN OF CARE
Problem: Discharge Planning  Goal: Discharge to home or other facility with appropriate resources  12/2/2024 1536 by Mello Reyes RN  Outcome: Progressing  Flowsheets (Taken 12/2/2024 0800)  Discharge to home or other facility with appropriate resources:   Identify barriers to discharge with patient and caregiver   Arrange for needed discharge resources and transportation as appropriate   Identify discharge learning needs (meds, wound care, etc)   Refer to discharge planning if patient needs post-hospital services based on physician order or complex needs related to functional status, cognitive ability or social support system  12/2/2024 0525 by Ofe Rai RN  Outcome: Progressing  12/2/2024 0502 by Ofe Rai RN  Outcome: Progressing     Problem: Pain  Goal: Verbalizes/displays adequate comfort level or baseline comfort level  12/2/2024 1536 by Mello Reyes RN  Outcome: Progressing  12/2/2024 0525 by Ofe Rai RN  Outcome: Progressing  12/2/2024 0502 by Ofe Rai RN  Outcome: Progressing  Flowsheets (Taken 12/1/2024 1550 by Michelle Nichols, RN)  Verbalizes/displays adequate comfort level or baseline comfort level: Encourage patient to monitor pain and request assistance

## 2024-12-02 NOTE — PLAN OF CARE
Problem: Discharge Planning  Goal: Discharge to home or other facility with appropriate resources  Outcome: Progressing     Problem: Pain  Goal: Verbalizes/displays adequate comfort level or baseline comfort level  Outcome: Progressing  Flowsheets (Taken 12/1/2024 1550 by Michelle Nichols RN)  Verbalizes/displays adequate comfort level or baseline comfort level: Encourage patient to monitor pain and request assistance

## 2024-12-03 ENCOUNTER — APPOINTMENT (OUTPATIENT)
Dept: ULTRASOUND IMAGING | Age: 24
DRG: 720 | End: 2024-12-03
Payer: MEDICAID

## 2024-12-03 PROBLEM — D07.1 VULVAR INTRAEPITHELIAL NEOPLASIA (VIN) GRADE 3: Status: ACTIVE | Noted: 2024-12-03

## 2024-12-03 LAB
ALBUMIN SERPL-MCNC: 2.9 G/DL (ref 3.5–5.2)
ALBUMIN/GLOB SERPL: 1 {RATIO} (ref 1–2.5)
ALP SERPL-CCNC: 140 U/L (ref 35–104)
ALT SERPL-CCNC: 588 U/L (ref 10–35)
ANION GAP SERPL CALCULATED.3IONS-SCNC: 10 MMOL/L (ref 9–16)
AST SERPL-CCNC: 369 U/L (ref 10–35)
BASOPHILS # BLD: 0 K/UL (ref 0–0.2)
BASOPHILS NFR BLD: 0 % (ref 0–2)
BILIRUB DIRECT SERPL-MCNC: 0.3 MG/DL (ref 0–0.2)
BILIRUB INDIRECT SERPL-MCNC: 0.2 MG/DL (ref 0–1)
BILIRUB SERPL-MCNC: 0.5 MG/DL (ref 0–1.2)
BUN SERPL-MCNC: 4 MG/DL (ref 6–20)
CALCIUM SERPL-MCNC: 7.8 MG/DL (ref 8.6–10.4)
CHLORIDE SERPL-SCNC: 104 MMOL/L (ref 98–107)
CO2 SERPL-SCNC: 22 MMOL/L (ref 20–31)
CREAT SERPL-MCNC: 0.5 MG/DL (ref 0.6–0.9)
ECHO BSA: 2.27 M2
EOSINOPHIL # BLD: 0 K/UL (ref 0–0.4)
EOSINOPHILS RELATIVE PERCENT: 0 % (ref 1–4)
ERYTHROCYTE [DISTWIDTH] IN BLOOD BY AUTOMATED COUNT: 14.7 % (ref 11.8–14.4)
GFR, ESTIMATED: >90 ML/MIN/1.73M2
GLOBULIN SER CALC-MCNC: 3 G/DL
GLUCOSE SERPL-MCNC: 103 MG/DL (ref 74–99)
HCT VFR BLD AUTO: 23.9 % (ref 36.3–47.1)
HGB BLD-MCNC: 7.7 G/DL (ref 11.9–15.1)
IMM GRANULOCYTES # BLD AUTO: 0 K/UL (ref 0–0.3)
IMM GRANULOCYTES NFR BLD: 0 %
INR PPP: 1.4
LYMPHOCYTES NFR BLD: 0.67 K/UL (ref 1–4.8)
LYMPHOCYTES RELATIVE PERCENT: 52 % (ref 24–44)
MCH RBC QN AUTO: 30.9 PG (ref 25.2–33.5)
MCHC RBC AUTO-ENTMCNC: 32.2 G/DL (ref 28.4–34.8)
MCV RBC AUTO: 96 FL (ref 82.6–102.9)
MONOCYTES NFR BLD: 0.04 K/UL (ref 0.1–0.8)
MONOCYTES NFR BLD: 3 % (ref 1–7)
MORPHOLOGY: ABNORMAL
NEUTROPHILS NFR BLD: 45 % (ref 36–66)
NEUTS SEG NFR BLD: 0.59 K/UL (ref 1.8–7.7)
NRBC BLD-RTO: 0 PER 100 WBC
NUCLEATED RED BLOOD CELLS: 1 PER 100 WBC
PLATELET # BLD AUTO: ABNORMAL K/UL (ref 138–453)
PLATELET, FLUORESCENCE: 99 K/UL (ref 138–453)
PLATELETS.RETICULATED NFR BLD AUTO: 3.4 % (ref 1.1–10.3)
POTASSIUM SERPL-SCNC: 3.7 MMOL/L (ref 3.7–5.3)
PROT SERPL-MCNC: 5.9 G/DL (ref 6.6–8.7)
PROTHROMBIN TIME: 16.8 SEC (ref 11.7–14.9)
RBC # BLD AUTO: 2.49 M/UL (ref 3.95–5.11)
SODIUM SERPL-SCNC: 136 MMOL/L (ref 136–145)
SURGICAL PATHOLOGY REPORT: NORMAL
WBC OTHER # BLD: 1.3 K/UL (ref 3.5–11.3)

## 2024-12-03 PROCEDURE — 99232 SBSQ HOSP IP/OBS MODERATE 35: CPT | Performed by: INTERNAL MEDICINE

## 2024-12-03 PROCEDURE — 2580000003 HC RX 258: Performed by: INTERNAL MEDICINE

## 2024-12-03 PROCEDURE — 6370000000 HC RX 637 (ALT 250 FOR IP): Performed by: INTERNAL MEDICINE

## 2024-12-03 PROCEDURE — 2060000000 HC ICU INTERMEDIATE R&B

## 2024-12-03 PROCEDURE — 87529 HSV DNA AMP PROBE: CPT

## 2024-12-03 PROCEDURE — 2580000003 HC RX 258

## 2024-12-03 PROCEDURE — 6360000002 HC RX W HCPCS: Performed by: INTERNAL MEDICINE

## 2024-12-03 PROCEDURE — 85025 COMPLETE CBC W/AUTO DIFF WBC: CPT

## 2024-12-03 PROCEDURE — 76705 ECHO EXAM OF ABDOMEN: CPT

## 2024-12-03 PROCEDURE — A4217 STERILE WATER/SALINE, 500 ML: HCPCS

## 2024-12-03 PROCEDURE — 2580000003 HC RX 258: Performed by: STUDENT IN AN ORGANIZED HEALTH CARE EDUCATION/TRAINING PROGRAM

## 2024-12-03 PROCEDURE — 80076 HEPATIC FUNCTION PANEL: CPT

## 2024-12-03 PROCEDURE — 85055 RETICULATED PLATELET ASSAY: CPT

## 2024-12-03 PROCEDURE — 85610 PROTHROMBIN TIME: CPT

## 2024-12-03 PROCEDURE — 93970 EXTREMITY STUDY: CPT | Performed by: STUDENT IN AN ORGANIZED HEALTH CARE EDUCATION/TRAINING PROGRAM

## 2024-12-03 PROCEDURE — 36415 COLL VENOUS BLD VENIPUNCTURE: CPT

## 2024-12-03 PROCEDURE — 80048 BASIC METABOLIC PNL TOTAL CA: CPT

## 2024-12-03 PROCEDURE — 6360000002 HC RX W HCPCS: Performed by: STUDENT IN AN ORGANIZED HEALTH CARE EDUCATION/TRAINING PROGRAM

## 2024-12-03 RX ORDER — OXYCODONE HYDROCHLORIDE 5 MG/1
5 TABLET ORAL EVERY 6 HOURS PRN
Status: DISCONTINUED | OUTPATIENT
Start: 2024-12-03 | End: 2024-12-10 | Stop reason: HOSPADM

## 2024-12-03 RX ORDER — POLYETHYLENE GLYCOL 3350 17 G/17G
17 POWDER, FOR SOLUTION ORAL 2 TIMES DAILY
Status: DISCONTINUED | OUTPATIENT
Start: 2024-12-03 | End: 2024-12-10 | Stop reason: HOSPADM

## 2024-12-03 RX ORDER — DOCUSATE SODIUM 100 MG/1
200 CAPSULE, LIQUID FILLED ORAL 2 TIMES DAILY
Status: DISCONTINUED | OUTPATIENT
Start: 2024-12-03 | End: 2024-12-08

## 2024-12-03 RX ORDER — LIDOCAINE 4 G/G
1 PATCH TOPICAL DAILY
Status: DISCONTINUED | OUTPATIENT
Start: 2024-12-03 | End: 2024-12-10 | Stop reason: HOSPADM

## 2024-12-03 RX ADMIN — LIDOCAINE HYDROCHLORIDE 15 ML: 20 SOLUTION ORAL; TOPICAL at 21:46

## 2024-12-03 RX ADMIN — POLYETHYLENE GLYCOL 3350 17 G: 17 POWDER, FOR SOLUTION ORAL at 21:47

## 2024-12-03 RX ADMIN — ACYCLOVIR SODIUM 550 MG: 50 INJECTION, SOLUTION INTRAVENOUS at 18:57

## 2024-12-03 RX ADMIN — CEFEPIME 2000 MG: 2 INJECTION, POWDER, FOR SOLUTION INTRAVENOUS at 23:33

## 2024-12-03 RX ADMIN — SODIUM CHLORIDE 1500 MG: 9 INJECTION, SOLUTION INTRAVENOUS at 10:21

## 2024-12-03 RX ADMIN — CLOTRIMAZOLE: 10 CREAM VAGINAL at 21:46

## 2024-12-03 RX ADMIN — SODIUM CHLORIDE: 900 IRRIGANT IRRIGATION at 05:16

## 2024-12-03 RX ADMIN — CLOTRIMAZOLE: 10 CREAM VAGINAL at 08:24

## 2024-12-03 RX ADMIN — ACYCLOVIR SODIUM 550 MG: 50 INJECTION, SOLUTION INTRAVENOUS at 02:19

## 2024-12-03 RX ADMIN — ENOXAPARIN SODIUM 30 MG: 100 INJECTION SUBCUTANEOUS at 08:23

## 2024-12-03 RX ADMIN — LIDOCAINE HYDROCHLORIDE 15 ML: 20 SOLUTION ORAL; TOPICAL at 17:26

## 2024-12-03 RX ADMIN — MICONAZOLE NITRATE: 20 CREAM TOPICAL at 21:46

## 2024-12-03 RX ADMIN — PREDNISONE 10 MG: 10 TABLET ORAL at 21:45

## 2024-12-03 RX ADMIN — SODIUM CHLORIDE: 900 IRRIGANT IRRIGATION at 09:19

## 2024-12-03 RX ADMIN — SODIUM CHLORIDE, PRESERVATIVE FREE 10 ML: 5 INJECTION INTRAVENOUS at 08:24

## 2024-12-03 RX ADMIN — SODIUM CHLORIDE: 900 IRRIGANT IRRIGATION at 13:36

## 2024-12-03 RX ADMIN — Medication 15 ML: at 09:18

## 2024-12-03 RX ADMIN — CEFEPIME 2000 MG: 2 INJECTION, POWDER, FOR SOLUTION INTRAVENOUS at 12:12

## 2024-12-03 RX ADMIN — MICONAZOLE NITRATE: 20 CREAM TOPICAL at 08:23

## 2024-12-03 RX ADMIN — SODIUM CHLORIDE: 900 IRRIGANT IRRIGATION at 21:47

## 2024-12-03 RX ADMIN — FAMOTIDINE 20 MG: 20 TABLET, FILM COATED ORAL at 21:46

## 2024-12-03 RX ADMIN — SODIUM CHLORIDE 1500 MG: 9 INJECTION, SOLUTION INTRAVENOUS at 04:06

## 2024-12-03 RX ADMIN — DOCUSATE SODIUM 200 MG: 100 CAPSULE, LIQUID FILLED ORAL at 21:46

## 2024-12-03 RX ADMIN — OXYCODONE 5 MG: 5 TABLET ORAL at 10:14

## 2024-12-03 RX ADMIN — SODIUM CHLORIDE 1500 MG: 9 INJECTION, SOLUTION INTRAVENOUS at 19:01

## 2024-12-03 RX ADMIN — ENOXAPARIN SODIUM 30 MG: 100 INJECTION SUBCUTANEOUS at 21:46

## 2024-12-03 RX ADMIN — SODIUM CHLORIDE: 900 IRRIGANT IRRIGATION at 00:47

## 2024-12-03 RX ADMIN — SODIUM CHLORIDE: 9 INJECTION, SOLUTION INTRAVENOUS at 06:18

## 2024-12-03 RX ADMIN — FAMOTIDINE 20 MG: 20 TABLET, FILM COATED ORAL at 08:23

## 2024-12-03 RX ADMIN — CEFEPIME 2000 MG: 2 INJECTION, POWDER, FOR SOLUTION INTRAVENOUS at 00:01

## 2024-12-03 RX ADMIN — OXYCODONE 5 MG: 5 TABLET ORAL at 18:52

## 2024-12-03 RX ADMIN — SODIUM CHLORIDE, PRESERVATIVE FREE 10 ML: 5 INJECTION INTRAVENOUS at 21:46

## 2024-12-03 RX ADMIN — PREDNISONE 10 MG: 10 TABLET ORAL at 08:23

## 2024-12-03 RX ADMIN — SODIUM CHLORIDE: 900 IRRIGANT IRRIGATION at 17:27

## 2024-12-03 RX ADMIN — ACYCLOVIR SODIUM 550 MG: 50 INJECTION, SOLUTION INTRAVENOUS at 10:26

## 2024-12-03 ASSESSMENT — PAIN SCALES - GENERAL
PAINLEVEL_OUTOF10: 10
PAINLEVEL_OUTOF10: 8
PAINLEVEL_OUTOF10: 10

## 2024-12-03 ASSESSMENT — PAIN DESCRIPTION - DESCRIPTORS: DESCRIPTORS: TIGHTNESS

## 2024-12-03 ASSESSMENT — PAIN DESCRIPTION - LOCATION: LOCATION: BACK

## 2024-12-03 NOTE — CARE COORDINATION
Met with patient to discuss transitional planning. She is returning home. She is requesting a rolling walker. She denies other needs

## 2024-12-03 NOTE — PLAN OF CARE
Problem: Discharge Planning  Goal: Discharge to home or other facility with appropriate resources  12/2/2024 2000 by Elizabeth Ballesteros RN  Outcome: Progressing  12/2/2024 1536 by Mello Reyes RN  Outcome: Progressing  Flowsheets (Taken 12/2/2024 0800)  Discharge to home or other facility with appropriate resources:   Identify barriers to discharge with patient and caregiver   Arrange for needed discharge resources and transportation as appropriate   Identify discharge learning needs (meds, wound care, etc)   Refer to discharge planning if patient needs post-hospital services based on physician order or complex needs related to functional status, cognitive ability or social support system     Problem: Pain  Goal: Verbalizes/displays adequate comfort level or baseline comfort level  12/2/2024 2000 by Elizabeth Ballesteros RN  Outcome: Progressing  12/2/2024 1536 by Mello Reyes RN  Outcome: Progressing     Problem: Chronic Conditions and Co-morbidities  Goal: Patient's chronic conditions and co-morbidity symptoms are monitored and maintained or improved  Outcome: Progressing     Problem: Skin/Tissue Integrity  Goal: Absence of new skin breakdown  Description: 1.  Monitor for areas of redness and/or skin breakdown  2.  Assess vascular access sites hourly  3.  Every 4-6 hours minimum:  Change oxygen saturation probe site  4.  Every 4-6 hours:  If on nasal continuous positive airway pressure, respiratory therapy assess nares and determine need for appliance change or resting period.  Outcome: Progressing

## 2024-12-03 NOTE — PLAN OF CARE
Problem: Discharge Planning  Goal: Discharge to home or other facility with appropriate resources  Outcome: Progressing  Flowsheets (Taken 12/3/2024 0800)  Discharge to home or other facility with appropriate resources:   Identify barriers to discharge with patient and caregiver   Arrange for needed discharge resources and transportation as appropriate   Identify discharge learning needs (meds, wound care, etc)   Refer to discharge planning if patient needs post-hospital services based on physician order or complex needs related to functional status, cognitive ability or social support system     Problem: Pain  Goal: Verbalizes/displays adequate comfort level or baseline comfort level  Outcome: Progressing     Problem: Chronic Conditions and Co-morbidities  Goal: Patient's chronic conditions and co-morbidity symptoms are monitored and maintained or improved  Outcome: Progressing     Problem: Skin/Tissue Integrity  Goal: Absence of new skin breakdown  Description: 1.  Monitor for areas of redness and/or skin breakdown  2.  Assess vascular access sites hourly  3.  Every 4-6 hours minimum:  Change oxygen saturation probe site  4.  Every 4-6 hours:  If on nasal continuous positive airway pressure, respiratory therapy assess nares and determine need for appliance change or resting period.  Outcome: Progressing

## 2024-12-03 NOTE — RT PROTOCOL NOTE
home.    0-3 - enter or revise RT bronchodilator order(s) to equivalent RT Bronchodilator order with Frequency of every 4 hours PRN for wheezing or increased work of breathing using Per Protocol order mode.        4-6 - enter or revise RT Bronchodilator order(s) to two equivalent RT bronchodilator orders with one order with BID Frequency and one order with Frequency of every 4 hours PRN wheezing or increased work of breathing using Per Protocol order mode.        7-10 - enter or revise RT Bronchodilator order(s) to two equivalent RT bronchodilator orders with one order with TID Frequency and one order with Frequency of every 4 hours PRN wheezing or increased work of breathing using Per Protocol order mode.       11-13 - enter or revise RT Bronchodilator order(s) to one equivalent RT bronchodilator order with QID Frequency and an Albuterol order with Frequency of every 4 hours PRN wheezing or increased work of breathing using Per Protocol order mode.      Greater than 13 - enter or revise RT Bronchodilator order(s) to one equivalent RT bronchodilator order with every 4 hours Frequency and an Albuterol order with Frequency of every 2 hours PRN wheezing or increased work of breathing using Per Protocol order mode.     RT to enter RT Home Evaluation for COPD & MDI Assessment order using Per Protocol order mode.    Electronically signed by Nella Romano RCP on 12/2/2024 at 7:49 PM

## 2024-12-03 NOTE — PLAN OF CARE
Plastic surgery plan of care:    After further discussion with Dr. 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 Patient does have trephination of nail with no obvious purulence that is able to be expressed from wound.  Small amount of blistering on the ulnar and radial aspects of the digit with healing wound from previous de-edu of blister on dorsal aspect of digit.  At this time feel that blisters do not need to be de-roofed.  Plastic surgery will continue to follow patient to see progression but would like to rule out herpetic brody of digit.  Plastic surgery recommends infectious disease workup for herpetic brody of the left middle digit.     Gregory Mcdaniel DO  Orthopedic Surgery, PGY-1  [place], [place]

## 2024-12-03 NOTE — CONSULTS
[id number] [place] [address]                              CONSULTATION      PATIENT NAME: HERRERA REVELES               : 2000  MED REC NO: [id number]                         ROOM: 2018  ACCOUNT NO: [id number]                       ADMIT DATE: 2024  PROVIDER: Edward Goldberger, MD    RHEUMATOLOGY CONSULTATION    CONSULT DATE: 2024      REASON FOR CONSULTATION:  History of autoimmune disease.    HISTORY OF PRESENT ILLNESS:  This is a 24-year-old female, admitted to [facility] yesterday, , where she presented with a fever, infected left middle finger, and a blistering-type rash on her labia, the latter may have been provoked by recent shaving.  She has a history of autoimmune disease described by Dr. Mccrary at [facility] as best with granulomatosis with polyangiitis overlap.  She was noted to be leukopenic and with a fever and borderline testing for sepsis, procalcitonin level was a little high, decision made to admit and evaluate further.  She had pulled on a friend's scooty a few days ago and got a cut at the base of the left middle finger nail and that has worsened despite outpatient antibiotic treatment.  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  According to her mother, she has had somewhat unexplained illnesses or propensity towards infection since she was a young child of 8 or 9.  About 4 years ago, the rash on her leg was biopsied and demonstrated to be interface dermatitis.  Subsequently, she was admitted 
    Plastic Surgery Consult      CC/Reason for consult: Left middle finger paronychia    HPI:      The patient is a 24 y.o. female with left middle finger pain.  She initially injured the finger while putting on a hoodie sweatshirt on 11/20, 11 days ago, at which point her nail was transiently bent backwards.  6 days later, on 11/26, patient presented to the emergency department for worsening left finger pain.  At that point the nail was trephinated in the emergency department and she was placed on oral antibiotics and sent home.  Patient did not improve.  She presented again to the hospital yesterday on 11/30 for worsening left finger pain and decreased range of motion.  She was admitted to the ICU for cellulitis and a fever of 103 degrees.  Plastic surgery was consulted for evaluation management of the left finger paronychia.  Upon evaluation at bedside, patient endorses history above.  She states the abscess in left middle finger has gradually worsened.  No acute injury or inciting event.  She denies active drainage at home but endorses enlargement of the blister on the dorsal aspect of her middle finger.  She has been taking her antibiotics as prescribed.  She endorses fever, chills, feeling ill over the past few days.  She is also dealing with flare of bacterial vaginitis which she is also being worked up for inpatient.  She has history of SLE on chronic immunosuppressive medications.  We will plan for a bedside incision and drainage to obtain cultures of the left middle finger abscess.  Will then begin Betadine soaks every 3 hours and will trend clinically.    GCS 15. Vitals were reviewed. Patient does not complain of any other orthopedic issues.    Past Medical History:    Past Medical History:   Diagnosis Date    Lupus        Past Surgical History:    History reviewed. No pertinent surgical history.    Medications Prior to Admission:   Prior to Admission medications    Medication Sig Start Date End Date 
    Today's Date: 12/2/2024  Patient Name: Patricia Bansal  Date of admission: 11/30/2024  2:43 PM  Patient's age: 24 y.o., 2000  Admission Dx: Cellulitis [L03.90]  Left hand pain [M79.642]  Folliculitis of perineum [L73.9]  Acute cystitis with hematuria [N30.01]  Neutropenia, unspecified type (HCC) [D70.9]    Reason for Consult: management recommendations  Requesting Physician: No admitting provider for patient encounter.    CHIEF COMPLAINT: Pancytopenia    History Obtained From:  patient, electronic medical record    HISTORY OF PRESENT ILLNESS:      The patient is a 24 y.o.  female with history of lupus on immunosuppressive agents presented to the ER with pain in her finger and erythema associated with fever and chills.  Patient diagnosed with paronychia which was drained.  Patient also found to have blistering vaginal rash positive for HSV 1.  Abnormal LFTs.  Patient being followed up by infectious disease team.  Patient on cefepime and Vanco.  Patient on IV acyclovir.    CBC shows WBC count of 0.6.  Hemoglobin 7.8.  Platelet count 96,000.  ANC 0.41.  Absolute lymphocyte count 0.17.  Tmax 92.  TOMI screen is negative.  Absolute reticulocyte count is suppressed patient has adequate B12 folic acid stores.  Ferritin significantly elevated.  With iron saturation of 13% HIV screen negative    Review of patient's chart shows patient has a longstanding history of cytopenias but did not have severe cytopenias before.        Past Medical History:   has a past medical history of Lupus.    Past Surgical History:   has no past surgical history on file.     Medications:    Prior to Admission medications    Medication Sig Start Date End Date Taking? Authorizing Provider   clindamycin (CLEOCIN) 300 MG capsule Take 1 capsule by mouth 3 times daily for 7 days 11/30/24 12/7/24 Yes Domo Saez MD   clotrimazole (ATHLETES FOOT) 1 % cream Apply topically 2 times daily. 11/30/24 12/7/24 Yes Domo Saez MD 
    [facility]  Consultation Note     .  Chief Complaint:  Fever and chills    Reason for consult:    Transaminitis    History of present illness:   This is a 24 y.o. female with PMH including SLE on multiple medications, who originally presented to the ED due to pain in her finger with erythema, fevers and chills.   GI was consulted due to elevated LFTs  Patient denies any smoking drinking drug use  No previous known issues with her liver  No personal or family history of GI malignancy  Patient has been on multiple antibiotics recently including Bactrim, Keflex  She reports slight tenderness in the right upper quadrant  Upon arrival patient was tachycardic, fever 103 and was admitted for SIRS with sepsis with immunocompromise state  Liver ultrasound unremarkable, common bile duct 3 mm  BMP shows sodium 135, potassium 3.6,   LFTs show alk phos , [phone], AST [id number] bilirubin has remained stable, INR 1.5-1.4, D-dimer 15.31  Alpha-1 antitrypsin 296 GGT 63, ceruloplasmin WNL, alpha-1 antitrypsin 296, haptoglobin normal  CBC shows WBCs 1.2-0.6, hemoglobin 10.7-7.8-no signs of overt GI bleeding, MCV 95.7, platelets 96, absolute neutrophils 0.41  Ferritin 5632, B12 greater than 2000, iron sat 13%  Acute hepatitis panel nonreactive  TOMI negative, AMA negative, ASMA negative Dr. Rose's hi Dr. Rose      Summary of imaging completed at this time:  Right upper quadrant ultrasound unremarkable    Previous GI history:   No previous EGD or colonoscopy  Primary GI specialist:    Past Medical/Social/Family History:  Past Medical History:   Diagnosis Date    Lupus      History reviewed. No pertinent surgical history.  No family history on file.  Previous records/history/ and notes were reviewed    Allergies:  Allergies   Allergen Reactions    Bactrim [Sulfamethoxazole-Trimethoprim]        Home medications:  Prior to Admission medications    Medication Sig Start Date End Date 
  OB/GYN Consult  [facility]    Patient Name: Patricia Bansal     Patient : 2000  Room/Bed: 35/35  Admission Date/Time: 2024  2:43 PM  Primary Care Physician: Ángela Bernstein MD    Consulting Provider: Dr. Winters  Reason for Consult: Concern for vaginal infection    CC: No chief complaint on file.               HPI: Patricia Bansal is a 24 y.o. female presents to the emergency department, c/o painful cutaneous infection of the groin.  Patient reports that she has been undergoing treatment for MARY 2-3, and underwent a wide local excision in  with subsequent 5-fluorouracil treatment.  In approximately October she developed a significant painful sores and erosions over the groin, and self discontinued the 5-FU.  On Wednesday she reports shaving the area, and developed a painful vesicular rash over the skin and has been extruding a yellow purulent discharge.  She also notably has a infection of the finger, and a history of Wegener's granulomatosis controlled with immunosuppressive therapy.  Review of symptoms is positive for fever.  In the emergency department she received Zosyn and antiviral medications.  A consult was placed to help determine etiology of skin infection. Patient's last menstrual period was 2024 (approximate).     REVIEW OF SYSTEMS:   A minimum of an eleven point review of systems was completed.    Constitutional: + fever, negative chills  HEENT: negative visual disturbances, negative headaches  Respiratory: negative dyspnea, negative cough  Cardiovascular: negative chest pain,  negative palpitations  Gastrointestinal: negative abdominal pain, negative RUQ pain, negative N/V, negative diarrhea, negative constipation  Genitourinary: negative dysuria, negative vaginal discharge, negative vaginal bleeding  Dermatological: + rash,  + wounds  Hematologic: negative bleeding/clotting disorder  Immunologic: negative recent illness, negative recent sick contact, negative 
Brief note, full consultation dictated. 25 y/o female described as having SLE/GPA overlap admitted with fever, left 3rd digit paronychia, blistering labial outbreak after shaving, has leukopenia and marked transaminase elevations.Had been out of prednisone several days PTA. Also on hydroxychloroquine and receives maintenance Rituxan every 6 months-last in July. No overt mucocutaneous or musculoskeletal manifestations of SLE currently but may be flared due to being suddenly out of prednisone or even slightly Addisonian. Will increase prednisone to 10 BID pending w/u agree with holding hydroxychloroquine for now although doubt leukopenia is from that. Thank you, will follow.   
Other RX Placeholder    vancomycin  1,500 mg IntraVENous Q12H       Social History:     Social History     Socioeconomic History    Marital status: Single     Spouse name: Not on file    Number of children: Not on file    Years of education: Not on file    Highest education level: Not on file   Occupational History    Not on file   Tobacco Use    Smoking status: Never    Smokeless tobacco: Never   Substance and Sexual Activity    Alcohol use: No    Drug use: Yes     Types: Marijuana (Weed)     Comment: few days weekly    Sexual activity: Yes     Partners: Male   Other Topics Concern    Not on file   Social History Narrative    Not on file     Social Determinants of Health     Financial Resource Strain: Not on file   Food Insecurity: No Food Insecurity (11/30/2024)    Hunger Vital Sign     Worried About Running Out of Food in the Last Year: Never true     Ran Out of Food in the Last Year: Never true   Transportation Needs: No Transportation Needs (11/30/2024)    PRAPARE - Transportation     Lack of Transportation (Medical): No     Lack of Transportation (Non-Medical): No   Physical Activity: Not on file   Stress: Not on file   Social Connections: Not on file   Intimate Partner Violence: Not At Risk (12/11/2023)    [facility]    [place] Safety & Environment     Within the last year, have you been afraid of your partner or ex-partner?: No     Within the last year, have you been humiliated or emotionally abused in other ways by your partner or ex-partner?: No     Within the last year, have you been kicked, hit, slapped, or otherwise physically hurt by your partner or ex-partner?: No     Within the last year, have you been raped or forced to have any kind of sexual activity by your partner or ex-partner?: No     Physically or Sexually Abused: Not on file   Housing Stability: Low Risk  (11/30/2024)    Housing Stability Vital Sign     Unable to Pay for Housing in the Last

## 2024-12-04 PROBLEM — A60.00 HERPES SIMPLEX INFECTION OF GENITOURINARY SYSTEM: Status: ACTIVE | Noted: 2024-12-02

## 2024-12-04 LAB
ALBUMIN SERPL-MCNC: 2.9 G/DL (ref 3.5–5.2)
ALBUMIN/GLOB SERPL: 0.9 {RATIO} (ref 1–2.5)
ALP SERPL-CCNC: 147 U/L (ref 35–104)
ALT SERPL-CCNC: 462 U/L (ref 10–35)
AST SERPL-CCNC: 286 U/L (ref 10–35)
BASOPHILS # BLD: 0 K/UL (ref 0–0.2)
BASOPHILS NFR BLD: 0 % (ref 0–2)
BILIRUB DIRECT SERPL-MCNC: 0.3 MG/DL (ref 0–0.2)
BILIRUB INDIRECT SERPL-MCNC: 0.2 MG/DL (ref 0–1)
BILIRUB SERPL-MCNC: 0.5 MG/DL (ref 0–1.2)
CRP SERPL HS-MCNC: 155 MG/L (ref 0–5)
EOSINOPHIL # BLD: 0 K/UL (ref 0–0.4)
EOSINOPHILS RELATIVE PERCENT: 0 % (ref 1–4)
ERYTHROCYTE [DISTWIDTH] IN BLOOD BY AUTOMATED COUNT: 15.6 % (ref 11.8–14.4)
FLOW CYTOMETRY BL: NORMAL
GLOBULIN SER CALC-MCNC: 3.1 G/DL
HCT VFR BLD AUTO: 24.4 % (ref 36.3–47.1)
HGB BLD-MCNC: 7.8 G/DL (ref 11.9–15.1)
IMM GRANULOCYTES # BLD AUTO: 0 K/UL (ref 0–0.3)
IMM GRANULOCYTES NFR BLD: 0 %
INR PPP: 1.3
LYMPHOCYTES NFR BLD: 0.52 K/UL (ref 1–4.8)
LYMPHOCYTES RELATIVE PERCENT: 47 % (ref 24–44)
MCH RBC QN AUTO: 31.8 PG (ref 25.2–33.5)
MCHC RBC AUTO-ENTMCNC: 32 G/DL (ref 28.4–34.8)
MCV RBC AUTO: 99.6 FL (ref 82.6–102.9)
MICROORGANISM SPEC CULT: ABNORMAL
MICROORGANISM/AGENT SPEC: ABNORMAL
MONOCYTES NFR BLD: 0.11 K/UL (ref 0.1–0.8)
MONOCYTES NFR BLD: 10 % (ref 1–7)
MORPHOLOGY: ABNORMAL
MRSA, DNA, NASAL: NEGATIVE
NEUTROPHILS NFR BLD: 43 % (ref 36–66)
NEUTS SEG NFR BLD: 0.47 K/UL (ref 1.8–7.7)
NRBC BLD-RTO: 0 PER 100 WBC
PLATELET # BLD AUTO: 112 K/UL (ref 138–453)
PMV BLD AUTO: 12.6 FL (ref 8.1–13.5)
PROT SERPL-MCNC: 6 G/DL (ref 6.6–8.7)
PROTHROMBIN TIME: 16.2 SEC (ref 11.7–14.9)
RBC # BLD AUTO: 2.45 M/UL (ref 3.95–5.11)
SERVICE CMNT-IMP: ABNORMAL
SPECIMEN DESCRIPTION: ABNORMAL
SPECIMEN DESCRIPTION: NORMAL
WBC OTHER # BLD: 1.1 K/UL (ref 3.5–11.3)
ZINC SERPL-MCNC: 116.3 UG/DL (ref 60–120)

## 2024-12-04 PROCEDURE — 99232 SBSQ HOSP IP/OBS MODERATE 35: CPT | Performed by: INTERNAL MEDICINE

## 2024-12-04 PROCEDURE — 2580000003 HC RX 258

## 2024-12-04 PROCEDURE — 2580000003 HC RX 258: Performed by: STUDENT IN AN ORGANIZED HEALTH CARE EDUCATION/TRAINING PROGRAM

## 2024-12-04 PROCEDURE — 80076 HEPATIC FUNCTION PANEL: CPT

## 2024-12-04 PROCEDURE — 6360000002 HC RX W HCPCS: Performed by: STUDENT IN AN ORGANIZED HEALTH CARE EDUCATION/TRAINING PROGRAM

## 2024-12-04 PROCEDURE — 6360000002 HC RX W HCPCS: Performed by: INTERNAL MEDICINE

## 2024-12-04 PROCEDURE — 2580000003 HC RX 258: Performed by: INTERNAL MEDICINE

## 2024-12-04 PROCEDURE — 2060000000 HC ICU INTERMEDIATE R&B

## 2024-12-04 PROCEDURE — 86140 C-REACTIVE PROTEIN: CPT

## 2024-12-04 PROCEDURE — 6370000000 HC RX 637 (ALT 250 FOR IP): Performed by: INTERNAL MEDICINE

## 2024-12-04 PROCEDURE — 85610 PROTHROMBIN TIME: CPT

## 2024-12-04 PROCEDURE — A4217 STERILE WATER/SALINE, 500 ML: HCPCS

## 2024-12-04 PROCEDURE — 36415 COLL VENOUS BLD VENIPUNCTURE: CPT

## 2024-12-04 PROCEDURE — 85025 COMPLETE CBC W/AUTO DIFF WBC: CPT

## 2024-12-04 RX ORDER — PREDNISONE 20 MG/1
20 TABLET ORAL 2 TIMES DAILY
Status: DISCONTINUED | OUTPATIENT
Start: 2024-12-04 | End: 2024-12-09

## 2024-12-04 RX ADMIN — DOCUSATE SODIUM 200 MG: 100 CAPSULE, LIQUID FILLED ORAL at 09:22

## 2024-12-04 RX ADMIN — ACYCLOVIR SODIUM 550 MG: 50 INJECTION, SOLUTION INTRAVENOUS at 10:39

## 2024-12-04 RX ADMIN — SODIUM CHLORIDE: 9 INJECTION, SOLUTION INTRAVENOUS at 10:40

## 2024-12-04 RX ADMIN — PREDNISONE 10 MG: 10 TABLET ORAL at 09:22

## 2024-12-04 RX ADMIN — SODIUM CHLORIDE: 900 IRRIGANT IRRIGATION at 17:13

## 2024-12-04 RX ADMIN — SODIUM CHLORIDE 3000 MG: 900 INJECTION INTRAVENOUS at 10:42

## 2024-12-04 RX ADMIN — ENOXAPARIN SODIUM 30 MG: 100 INJECTION SUBCUTANEOUS at 09:21

## 2024-12-04 RX ADMIN — SODIUM CHLORIDE, PRESERVATIVE FREE 10 ML: 5 INJECTION INTRAVENOUS at 21:14

## 2024-12-04 RX ADMIN — SODIUM CHLORIDE: 900 IRRIGANT IRRIGATION at 12:50

## 2024-12-04 RX ADMIN — OXYCODONE 5 MG: 5 TABLET ORAL at 02:16

## 2024-12-04 RX ADMIN — SODIUM CHLORIDE: 900 IRRIGANT IRRIGATION at 09:25

## 2024-12-04 RX ADMIN — SODIUM CHLORIDE: 9 INJECTION, SOLUTION INTRAVENOUS at 10:37

## 2024-12-04 RX ADMIN — LIDOCAINE HYDROCHLORIDE 15 ML: 20 SOLUTION ORAL; TOPICAL at 10:43

## 2024-12-04 RX ADMIN — MICONAZOLE NITRATE: 20 CREAM TOPICAL at 21:12

## 2024-12-04 RX ADMIN — LIDOCAINE HYDROCHLORIDE: 40 SOLUTION TOPICAL at 10:52

## 2024-12-04 RX ADMIN — SODIUM CHLORIDE 3000 MG: 900 INJECTION INTRAVENOUS at 15:44

## 2024-12-04 RX ADMIN — FAMOTIDINE 20 MG: 20 TABLET, FILM COATED ORAL at 09:22

## 2024-12-04 RX ADMIN — MICONAZOLE NITRATE: 20 CREAM TOPICAL at 09:24

## 2024-12-04 RX ADMIN — SODIUM CHLORIDE, PRESERVATIVE FREE 10 ML: 5 INJECTION INTRAVENOUS at 10:42

## 2024-12-04 RX ADMIN — SODIUM CHLORIDE 3000 MG: 900 INJECTION INTRAVENOUS at 22:20

## 2024-12-04 RX ADMIN — PREDNISONE 20 MG: 20 TABLET ORAL at 21:09

## 2024-12-04 RX ADMIN — SODIUM CHLORIDE: 900 IRRIGANT IRRIGATION at 21:23

## 2024-12-04 RX ADMIN — CLOTRIMAZOLE: 10 CREAM VAGINAL at 21:12

## 2024-12-04 RX ADMIN — ACYCLOVIR SODIUM 550 MG: 50 INJECTION, SOLUTION INTRAVENOUS at 01:53

## 2024-12-04 RX ADMIN — SODIUM CHLORIDE: 900 IRRIGANT IRRIGATION at 05:44

## 2024-12-04 RX ADMIN — ACYCLOVIR SODIUM 390 MG: 50 INJECTION, SOLUTION INTRAVENOUS at 17:12

## 2024-12-04 RX ADMIN — POLYETHYLENE GLYCOL 3350 17 G: 17 POWDER, FOR SOLUTION ORAL at 09:22

## 2024-12-04 RX ADMIN — SODIUM CHLORIDE 1500 MG: 9 INJECTION, SOLUTION INTRAVENOUS at 03:27

## 2024-12-04 RX ADMIN — OXYCODONE 5 MG: 5 TABLET ORAL at 21:59

## 2024-12-04 RX ADMIN — CLOTRIMAZOLE: 10 CREAM VAGINAL at 09:23

## 2024-12-04 RX ADMIN — ENOXAPARIN SODIUM 30 MG: 100 INJECTION SUBCUTANEOUS at 21:09

## 2024-12-04 RX ADMIN — LIDOCAINE HYDROCHLORIDE 15 ML: 20 SOLUTION ORAL; TOPICAL at 15:45

## 2024-12-04 RX ADMIN — LIDOCAINE HYDROCHLORIDE 15 ML: 20 SOLUTION ORAL; TOPICAL at 21:16

## 2024-12-04 RX ADMIN — SODIUM CHLORIDE: 900 IRRIGANT IRRIGATION at 01:53

## 2024-12-04 RX ADMIN — FAMOTIDINE 20 MG: 20 TABLET, FILM COATED ORAL at 21:09

## 2024-12-04 ASSESSMENT — PAIN DESCRIPTION - LOCATION
LOCATION: BACK;GROIN
LOCATION: GROIN
LOCATION: VAGINA
LOCATION: FINGER (COMMENT WHICH ONE)

## 2024-12-04 ASSESSMENT — PAIN SCALES - GENERAL
PAINLEVEL_OUTOF10: 10
PAINLEVEL_OUTOF10: 8
PAINLEVEL_OUTOF10: 10
PAINLEVEL_OUTOF10: 8
PAINLEVEL_OUTOF10: 7
PAINLEVEL_OUTOF10: 10
PAINLEVEL_OUTOF10: 10

## 2024-12-04 ASSESSMENT — PAIN DESCRIPTION - ORIENTATION: ORIENTATION: LEFT

## 2024-12-04 ASSESSMENT — PAIN DESCRIPTION - DESCRIPTORS: DESCRIPTORS: ACHING

## 2024-12-04 NOTE — PLAN OF CARE
Problem: Discharge Planning  Goal: Discharge to home or other facility with appropriate resources  12/3/2024 2051 by Elizabeth Ballesteros RN  Outcome: Progressing  12/3/2024 1813 by Mello Reyes RN  Outcome: Progressing  Flowsheets (Taken 12/3/2024 0800)  Discharge to home or other facility with appropriate resources:   Identify barriers to discharge with patient and caregiver   Arrange for needed discharge resources and transportation as appropriate   Identify discharge learning needs (meds, wound care, etc)   Refer to discharge planning if patient needs post-hospital services based on physician order or complex needs related to functional status, cognitive ability or social support system     Problem: Pain  Goal: Verbalizes/displays adequate comfort level or baseline comfort level  12/3/2024 2051 by Elizabeth Ballesteros RN  Outcome: Progressing  12/3/2024 1813 by Mello Reyes RN  Outcome: Progressing     Problem: Chronic Conditions and Co-morbidities  Goal: Patient's chronic conditions and co-morbidity symptoms are monitored and maintained or improved  12/3/2024 2051 by Elizabeth Ballesteros RN  Outcome: Progressing  12/3/2024 1813 by Mello Reyes RN  Outcome: Progressing     Problem: Skin/Tissue Integrity  Goal: Absence of new skin breakdown  Description: 1.  Monitor for areas of redness and/or skin breakdown  2.  Assess vascular access sites hourly  3.  Every 4-6 hours minimum:  Change oxygen saturation probe site  4.  Every 4-6 hours:  If on nasal continuous positive airway pressure, respiratory therapy assess nares and determine need for appliance change or resting period.  12/3/2024 2051 by Elizabeth Ballesteros RN  Outcome: Progressing  12/3/2024 1813 by Mello Reyes RN  Outcome: Progressing

## 2024-12-05 LAB
ANION GAP SERPL CALCULATED.3IONS-SCNC: 11 MMOL/L (ref 9–16)
BASOPHILS # BLD: 0 K/UL (ref 0–0.2)
BASOPHILS NFR BLD: 0 % (ref 0–2)
BUN SERPL-MCNC: 5 MG/DL (ref 6–20)
CALCIUM SERPL-MCNC: 7.9 MG/DL (ref 8.6–10.4)
CHLORIDE SERPL-SCNC: 102 MMOL/L (ref 98–107)
CO2 SERPL-SCNC: 25 MMOL/L (ref 20–31)
COPPER SERPL-MCNC: 145.6 UG/DL (ref 80–155)
CREAT SERPL-MCNC: 0.5 MG/DL (ref 0.6–0.9)
EOSINOPHIL # BLD: 0 K/UL (ref 0–0.4)
EOSINOPHILS RELATIVE PERCENT: 0 % (ref 1–4)
ERYTHROCYTE [DISTWIDTH] IN BLOOD BY AUTOMATED COUNT: 15.1 % (ref 11.8–14.4)
GFR, ESTIMATED: >90 ML/MIN/1.73M2
GLUCOSE SERPL-MCNC: 116 MG/DL (ref 74–99)
HCT VFR BLD AUTO: 23.8 % (ref 36.3–47.1)
HGB BLD-MCNC: 7.8 G/DL (ref 11.9–15.1)
IMM GRANULOCYTES # BLD AUTO: 0.1 K/UL (ref 0–0.3)
IMM GRANULOCYTES NFR BLD: 8 %
LKM AB TITR SER IF: NORMAL {TITER}
LYMPHOCYTES NFR BLD: 0.57 K/UL (ref 1–4.8)
LYMPHOCYTES RELATIVE PERCENT: 48 % (ref 24–44)
MCH RBC QN AUTO: 31 PG (ref 25.2–33.5)
MCHC RBC AUTO-ENTMCNC: 32.8 G/DL (ref 28.4–34.8)
MCV RBC AUTO: 94.4 FL (ref 82.6–102.9)
MICROORGANISM SPEC CULT: NORMAL
MICROORGANISM SPEC CULT: NORMAL
MONOCYTES NFR BLD: 0.01 K/UL (ref 0.1–0.8)
MONOCYTES NFR BLD: 1 % (ref 1–7)
MORPHOLOGY: ABNORMAL
MORPHOLOGY: ABNORMAL
NEUTROPHILS NFR BLD: 43 % (ref 36–66)
NEUTS SEG NFR BLD: 0.52 K/UL (ref 1.8–7.7)
NRBC BLD-RTO: 2.5 PER 100 WBC
NUCLEATED RED BLOOD CELLS: 2 PER 100 WBC
PLATELET # BLD AUTO: ABNORMAL K/UL (ref 138–453)
PLATELET, FLUORESCENCE: 111 K/UL (ref 138–453)
PLATELETS.RETICULATED NFR BLD AUTO: 3.9 % (ref 1.1–10.3)
POTASSIUM SERPL-SCNC: 3.8 MMOL/L (ref 3.7–5.3)
RBC # BLD AUTO: 2.52 M/UL (ref 3.95–5.11)
SERVICE CMNT-IMP: NORMAL
SERVICE CMNT-IMP: NORMAL
SODIUM SERPL-SCNC: 138 MMOL/L (ref 136–145)
SPECIMEN DESCRIPTION: NORMAL
SPECIMEN DESCRIPTION: NORMAL
WBC OTHER # BLD: 1.2 K/UL (ref 3.5–11.3)

## 2024-12-05 PROCEDURE — 99232 SBSQ HOSP IP/OBS MODERATE 35: CPT | Performed by: INTERNAL MEDICINE

## 2024-12-05 PROCEDURE — A4217 STERILE WATER/SALINE, 500 ML: HCPCS

## 2024-12-05 PROCEDURE — 6370000000 HC RX 637 (ALT 250 FOR IP): Performed by: INTERNAL MEDICINE

## 2024-12-05 PROCEDURE — 2580000003 HC RX 258: Performed by: INTERNAL MEDICINE

## 2024-12-05 PROCEDURE — 85025 COMPLETE CBC W/AUTO DIFF WBC: CPT

## 2024-12-05 PROCEDURE — 36415 COLL VENOUS BLD VENIPUNCTURE: CPT

## 2024-12-05 PROCEDURE — 80048 BASIC METABOLIC PNL TOTAL CA: CPT

## 2024-12-05 PROCEDURE — 6360000002 HC RX W HCPCS: Performed by: INTERNAL MEDICINE

## 2024-12-05 PROCEDURE — 6370000000 HC RX 637 (ALT 250 FOR IP): Performed by: NURSE PRACTITIONER

## 2024-12-05 PROCEDURE — 2580000003 HC RX 258

## 2024-12-05 PROCEDURE — 2060000000 HC ICU INTERMEDIATE R&B

## 2024-12-05 PROCEDURE — 85055 RETICULATED PLATELET ASSAY: CPT

## 2024-12-05 RX ORDER — POLYETHYLENE GLYCOL 3350 17 G/17G
17 POWDER, FOR SOLUTION ORAL DAILY PRN
COMMUNITY
Start: 2024-12-05 | End: 2025-01-04

## 2024-12-05 RX ORDER — MICONAZOLE NITRATE 20 MG/G
CREAM TOPICAL
COMMUNITY
Start: 2024-12-05

## 2024-12-05 RX ORDER — PSEUDOEPHEDRINE HCL 30 MG
200 TABLET ORAL 2 TIMES DAILY
Qty: 40 CAPSULE | Refills: 0 | Status: SHIPPED | OUTPATIENT
Start: 2024-12-05

## 2024-12-05 RX ORDER — CLOTRIMAZOLE 1 %
CREAM WITH APPLICATOR VAGINAL
COMMUNITY
Start: 2024-12-05 | End: 2024-12-12

## 2024-12-05 RX ORDER — OXYCODONE HYDROCHLORIDE 5 MG/1
5 TABLET ORAL EVERY 6 HOURS PRN
Qty: 12 TABLET | Refills: 0 | Status: SHIPPED | OUTPATIENT
Start: 2024-12-05 | End: 2024-12-08

## 2024-12-05 RX ADMIN — SODIUM CHLORIDE, PRESERVATIVE FREE 10 ML: 5 INJECTION INTRAVENOUS at 08:44

## 2024-12-05 RX ADMIN — OXYCODONE 5 MG: 5 TABLET ORAL at 23:52

## 2024-12-05 RX ADMIN — OXYCODONE 5 MG: 5 TABLET ORAL at 05:59

## 2024-12-05 RX ADMIN — CLOTRIMAZOLE: 10 CREAM VAGINAL at 23:14

## 2024-12-05 RX ADMIN — CLOTRIMAZOLE: 10 CREAM VAGINAL at 08:46

## 2024-12-05 RX ADMIN — SODIUM CHLORIDE: 900 IRRIGANT IRRIGATION at 01:47

## 2024-12-05 RX ADMIN — SODIUM CHLORIDE 3000 MG: 900 INJECTION INTRAVENOUS at 23:53

## 2024-12-05 RX ADMIN — SODIUM CHLORIDE: 900 IRRIGANT IRRIGATION at 10:01

## 2024-12-05 RX ADMIN — MICONAZOLE NITRATE: 20 CREAM TOPICAL at 08:46

## 2024-12-05 RX ADMIN — SODIUM CHLORIDE: 900 IRRIGANT IRRIGATION at 14:21

## 2024-12-05 RX ADMIN — PREDNISONE 20 MG: 20 TABLET ORAL at 22:25

## 2024-12-05 RX ADMIN — DOCUSATE SODIUM 200 MG: 100 CAPSULE, LIQUID FILLED ORAL at 08:43

## 2024-12-05 RX ADMIN — IBUPROFEN 600 MG: 600 TABLET, FILM COATED ORAL at 23:51

## 2024-12-05 RX ADMIN — SODIUM CHLORIDE: 900 IRRIGANT IRRIGATION at 23:11

## 2024-12-05 RX ADMIN — SODIUM CHLORIDE: 900 IRRIGANT IRRIGATION at 18:04

## 2024-12-05 RX ADMIN — LIDOCAINE HYDROCHLORIDE 15 ML: 20 SOLUTION ORAL; TOPICAL at 23:13

## 2024-12-05 RX ADMIN — OXYCODONE 5 MG: 5 TABLET ORAL at 17:00

## 2024-12-05 RX ADMIN — PREDNISONE 20 MG: 20 TABLET ORAL at 08:43

## 2024-12-05 RX ADMIN — ENOXAPARIN SODIUM 30 MG: 100 INJECTION SUBCUTANEOUS at 22:25

## 2024-12-05 RX ADMIN — ACYCLOVIR SODIUM 390 MG: 50 INJECTION, SOLUTION INTRAVENOUS at 01:57

## 2024-12-05 RX ADMIN — MICONAZOLE NITRATE: 20 CREAM TOPICAL at 23:14

## 2024-12-05 RX ADMIN — LIDOCAINE HYDROCHLORIDE 15 ML: 20 SOLUTION ORAL; TOPICAL at 14:22

## 2024-12-05 RX ADMIN — ACYCLOVIR SODIUM 390 MG: 50 INJECTION, SOLUTION INTRAVENOUS at 18:03

## 2024-12-05 RX ADMIN — SODIUM CHLORIDE 3000 MG: 900 INJECTION INTRAVENOUS at 04:33

## 2024-12-05 RX ADMIN — ENOXAPARIN SODIUM 30 MG: 100 INJECTION SUBCUTANEOUS at 08:42

## 2024-12-05 RX ADMIN — POLYETHYLENE GLYCOL 3350 17 G: 17 POWDER, FOR SOLUTION ORAL at 08:44

## 2024-12-05 RX ADMIN — SODIUM CHLORIDE: 900 IRRIGANT IRRIGATION at 06:02

## 2024-12-05 RX ADMIN — SODIUM CHLORIDE 3000 MG: 900 INJECTION INTRAVENOUS at 16:57

## 2024-12-05 RX ADMIN — LIDOCAINE HYDROCHLORIDE: 40 SOLUTION TOPICAL at 17:00

## 2024-12-05 RX ADMIN — LIDOCAINE HYDROCHLORIDE 15 ML: 20 SOLUTION ORAL; TOPICAL at 08:43

## 2024-12-05 RX ADMIN — LIDOCAINE HYDROCHLORIDE: 40 SOLUTION TOPICAL at 08:45

## 2024-12-05 RX ADMIN — FAMOTIDINE 20 MG: 20 TABLET, FILM COATED ORAL at 22:25

## 2024-12-05 RX ADMIN — ACYCLOVIR SODIUM 390 MG: 50 INJECTION, SOLUTION INTRAVENOUS at 10:41

## 2024-12-05 RX ADMIN — LIDOCAINE HYDROCHLORIDE: 40 SOLUTION TOPICAL at 23:21

## 2024-12-05 RX ADMIN — SODIUM CHLORIDE 3000 MG: 900 INJECTION INTRAVENOUS at 10:00

## 2024-12-05 RX ADMIN — FAMOTIDINE 20 MG: 20 TABLET, FILM COATED ORAL at 08:43

## 2024-12-05 RX ADMIN — SODIUM CHLORIDE, PRESERVATIVE FREE 10 ML: 5 INJECTION INTRAVENOUS at 22:25

## 2024-12-05 ASSESSMENT — PAIN SCALES - GENERAL
PAINLEVEL_OUTOF10: 5
PAINLEVEL_OUTOF10: 6
PAINLEVEL_OUTOF10: 9
PAINLEVEL_OUTOF10: 0
PAINLEVEL_OUTOF10: 3
PAINLEVEL_OUTOF10: 10
PAINLEVEL_OUTOF10: 3
PAINLEVEL_OUTOF10: 5
PAINLEVEL_OUTOF10: 3
PAINLEVEL_OUTOF10: 7
PAINLEVEL_OUTOF10: 6
PAINLEVEL_OUTOF10: 5
PAINLEVEL_OUTOF10: 7

## 2024-12-05 ASSESSMENT — PAIN DESCRIPTION - LOCATION
LOCATION: VAGINA
LOCATION: MOUTH
LOCATION: FINGER (COMMENT WHICH ONE)
LOCATION: GROIN
LOCATION: VAGINA;GROIN
LOCATION: MOUTH
LOCATION: MOUTH

## 2024-12-05 ASSESSMENT — PAIN DESCRIPTION - DESCRIPTORS
DESCRIPTORS: DISCOMFORT
DESCRIPTORS: DISCOMFORT;BURNING
DESCRIPTORS: DISCOMFORT;BURNING

## 2024-12-05 ASSESSMENT — PAIN SCALES - WONG BAKER
WONGBAKER_NUMERICALRESPONSE: NO HURT
WONGBAKER_NUMERICALRESPONSE: NO HURT

## 2024-12-05 ASSESSMENT — PAIN DESCRIPTION - ORIENTATION
ORIENTATION: MID
ORIENTATION: RIGHT;LEFT;MID
ORIENTATION: RIGHT;LEFT;MID

## 2024-12-05 ASSESSMENT — PAIN DESCRIPTION - PAIN TYPE: TYPE: ACUTE PAIN

## 2024-12-05 NOTE — CARE COORDINATION
TRANSITIONAL CARE/DISCHARGE PLANNING ONGOING EVALUATION      Reason for Admission: Cellulitis [L03.90]  Left hand pain [M79.642]  Folliculitis of perineum [L73.9]  Acute cystitis with hematuria [N30.01]  Neutropenia, unspecified type (HCC) [D70.9]     Hospital day:5    Patient goals/Transitional Plan:    Spoke with patient about transition and discharge planning, Plan remains DC home independent, Discussed possibility of home IV ATB, stated feels confident she is teachable.          Readmission Risk              Risk of Unplanned Readmission:  16

## 2024-12-05 NOTE — PLAN OF CARE
Problem: Pain  Goal: Verbalizes/displays adequate comfort level or baseline comfort level  12/5/2024 1105 by Tanya Espana, RN  Outcome: Progressing     Problem: Safety - Adult  Goal: Free from fall injury  Outcome: Progressing

## 2024-12-05 NOTE — PLAN OF CARE
Problem: Discharge Planning  Goal: Discharge to home or other facility with appropriate resources  Outcome: Progressing     Problem: Pain  Goal: Verbalizes/displays adequate comfort level or baseline comfort level  12/4/2024 2335 by Guillermina Siddiqui, RN  Outcome: Progressing  12/4/2024 1247 by Tanya Espana, RN  Outcome: Progressing     Problem: Chronic Conditions and Co-morbidities  Goal: Patient's chronic conditions and co-morbidity symptoms are monitored and maintained or improved  Outcome: Progressing     Problem: Skin/Tissue Integrity  Goal: Absence of new skin breakdown  Description: 1.  Monitor for areas of redness and/or skin breakdown  2.  Assess vascular access sites hourly  3.  Every 4-6 hours minimum:  Change oxygen saturation probe site  4.  Every 4-6 hours:  If on nasal continuous positive airway pressure, respiratory therapy assess nares and determine need for appliance change or resting period.  Outcome: Progressing

## 2024-12-06 LAB
BASOPHILS # BLD: 0 K/UL (ref 0–0.2)
BASOPHILS NFR BLD: 0 % (ref 0–2)
EOSINOPHIL # BLD: 0.01 K/UL (ref 0–0.4)
EOSINOPHILS RELATIVE PERCENT: 1 % (ref 1–4)
ERYTHROCYTE [DISTWIDTH] IN BLOOD BY AUTOMATED COUNT: 14.9 % (ref 11.8–14.4)
HCT VFR BLD AUTO: 26.3 % (ref 36.3–47.1)
HGB BLD-MCNC: 8.5 G/DL (ref 11.9–15.1)
HSV 1 SUBTYPE BY PCR: DETECTED
HSV 2 SUBTYPE BY PCR: NOT DETECTED
HSV SOURCE: ABNORMAL
IMM GRANULOCYTES # BLD AUTO: 0.01 K/UL (ref 0–0.3)
IMM GRANULOCYTES NFR BLD: 1 %
LYMPHOCYTES NFR BLD: 0.63 K/UL (ref 1–4.8)
LYMPHOCYTES RELATIVE PERCENT: 52 % (ref 24–44)
MCH RBC QN AUTO: 31 PG (ref 25.2–33.5)
MCHC RBC AUTO-ENTMCNC: 32.3 G/DL (ref 28.4–34.8)
MCV RBC AUTO: 96 FL (ref 82.6–102.9)
MONOCYTES NFR BLD: 0.01 K/UL (ref 0.1–0.8)
MONOCYTES NFR BLD: 1 % (ref 1–7)
MORPHOLOGY: ABNORMAL
MORPHOLOGY: ABNORMAL
NEUTROPHILS NFR BLD: 45 % (ref 36–66)
NEUTS SEG NFR BLD: 0.54 K/UL (ref 1.8–7.7)
NRBC BLD-RTO: 2.6 PER 100 WBC
PLATELET # BLD AUTO: 151 K/UL (ref 138–453)
PMV BLD AUTO: 11 FL (ref 8.1–13.5)
RBC # BLD AUTO: 2.74 M/UL (ref 3.95–5.11)
WBC OTHER # BLD: 1.2 K/UL (ref 3.5–11.3)

## 2024-12-06 PROCEDURE — 6370000000 HC RX 637 (ALT 250 FOR IP): Performed by: INTERNAL MEDICINE

## 2024-12-06 PROCEDURE — 2580000003 HC RX 258

## 2024-12-06 PROCEDURE — 2580000003 HC RX 258: Performed by: INTERNAL MEDICINE

## 2024-12-06 PROCEDURE — 2060000000 HC ICU INTERMEDIATE R&B

## 2024-12-06 PROCEDURE — 36415 COLL VENOUS BLD VENIPUNCTURE: CPT

## 2024-12-06 PROCEDURE — 6360000002 HC RX W HCPCS

## 2024-12-06 PROCEDURE — 0HTQXZZ RESECTION OF FINGER NAIL, EXTERNAL APPROACH: ICD-10-PCS

## 2024-12-06 PROCEDURE — 6360000002 HC RX W HCPCS: Performed by: INTERNAL MEDICINE

## 2024-12-06 PROCEDURE — 99232 SBSQ HOSP IP/OBS MODERATE 35: CPT | Performed by: INTERNAL MEDICINE

## 2024-12-06 PROCEDURE — A4217 STERILE WATER/SALINE, 500 ML: HCPCS

## 2024-12-06 PROCEDURE — 85025 COMPLETE CBC W/AUTO DIFF WBC: CPT

## 2024-12-06 PROCEDURE — 6370000000 HC RX 637 (ALT 250 FOR IP)

## 2024-12-06 RX ORDER — LIDOCAINE HYDROCHLORIDE 40 MG/ML
SOLUTION TOPICAL 2 TIMES DAILY
Status: DISCONTINUED | OUTPATIENT
Start: 2024-12-06 | End: 2024-12-10 | Stop reason: HOSPADM

## 2024-12-06 RX ORDER — NEOMYCIN/BACITRACIN/POLYMYXINB 3.5-400-5K
OINTMENT (GRAM) TOPICAL 2 TIMES DAILY
Status: DISCONTINUED | OUTPATIENT
Start: 2024-12-06 | End: 2024-12-10 | Stop reason: HOSPADM

## 2024-12-06 RX ORDER — LIDOCAINE HYDROCHLORIDE 10 MG/ML
20 INJECTION, SOLUTION INFILTRATION; PERINEURAL ONCE
Status: COMPLETED | OUTPATIENT
Start: 2024-12-06 | End: 2024-12-06

## 2024-12-06 RX ADMIN — LIDOCAINE HYDROCHLORIDE 15 ML: 20 SOLUTION ORAL; TOPICAL at 15:47

## 2024-12-06 RX ADMIN — SODIUM CHLORIDE: 900 IRRIGANT IRRIGATION at 03:24

## 2024-12-06 RX ADMIN — MICONAZOLE NITRATE: 20 CREAM TOPICAL at 08:48

## 2024-12-06 RX ADMIN — SODIUM CHLORIDE, PRESERVATIVE FREE 10 ML: 5 INJECTION INTRAVENOUS at 21:38

## 2024-12-06 RX ADMIN — SODIUM CHLORIDE, PRESERVATIVE FREE 10 ML: 5 INJECTION INTRAVENOUS at 08:47

## 2024-12-06 RX ADMIN — POLYETHYLENE GLYCOL 3350 17 G: 17 POWDER, FOR SOLUTION ORAL at 21:38

## 2024-12-06 RX ADMIN — MICONAZOLE NITRATE: 20 CREAM TOPICAL at 21:37

## 2024-12-06 RX ADMIN — ENOXAPARIN SODIUM 30 MG: 100 INJECTION SUBCUTANEOUS at 08:47

## 2024-12-06 RX ADMIN — LIDOCAINE HYDROCHLORIDE 15 ML: 20 SOLUTION ORAL; TOPICAL at 21:37

## 2024-12-06 RX ADMIN — LIDOCAINE HYDROCHLORIDE 20 ML: 10 INJECTION, SOLUTION INFILTRATION; PERINEURAL at 08:43

## 2024-12-06 RX ADMIN — ACYCLOVIR SODIUM 390 MG: 50 INJECTION, SOLUTION INTRAVENOUS at 18:37

## 2024-12-06 RX ADMIN — OXYCODONE 5 MG: 5 TABLET ORAL at 22:03

## 2024-12-06 RX ADMIN — ACYCLOVIR SODIUM 390 MG: 50 INJECTION, SOLUTION INTRAVENOUS at 10:50

## 2024-12-06 RX ADMIN — OXYCODONE 5 MG: 5 TABLET ORAL at 15:47

## 2024-12-06 RX ADMIN — LIDOCAINE HYDROCHLORIDE: 40 SOLUTION TOPICAL at 08:48

## 2024-12-06 RX ADMIN — ENOXAPARIN SODIUM 30 MG: 100 INJECTION SUBCUTANEOUS at 21:37

## 2024-12-06 RX ADMIN — FAMOTIDINE 20 MG: 20 TABLET, FILM COATED ORAL at 21:37

## 2024-12-06 RX ADMIN — POLYMYXIN B SULFATE, BACITRACIN ZINC, NEOMYCIN SULFATE: 5000; 3.5; 4 OINTMENT TOPICAL at 08:48

## 2024-12-06 RX ADMIN — PREDNISONE 20 MG: 20 TABLET ORAL at 21:37

## 2024-12-06 RX ADMIN — SODIUM CHLORIDE 3000 MG: 900 INJECTION INTRAVENOUS at 04:38

## 2024-12-06 RX ADMIN — DOCUSATE SODIUM 200 MG: 100 CAPSULE, LIQUID FILLED ORAL at 21:37

## 2024-12-06 RX ADMIN — SODIUM CHLORIDE 3000 MG: 900 INJECTION INTRAVENOUS at 09:57

## 2024-12-06 RX ADMIN — POLYMYXIN B SULFATE, BACITRACIN ZINC, NEOMYCIN SULFATE: 5000; 3.5; 4 OINTMENT TOPICAL at 21:38

## 2024-12-06 RX ADMIN — ACYCLOVIR SODIUM 390 MG: 50 INJECTION, SOLUTION INTRAVENOUS at 03:25

## 2024-12-06 RX ADMIN — LIDOCAINE HYDROCHLORIDE 15 ML: 20 SOLUTION ORAL; TOPICAL at 08:47

## 2024-12-06 RX ADMIN — DOCUSATE SODIUM 200 MG: 100 CAPSULE, LIQUID FILLED ORAL at 08:47

## 2024-12-06 RX ADMIN — SODIUM CHLORIDE 3000 MG: 900 INJECTION INTRAVENOUS at 22:21

## 2024-12-06 RX ADMIN — SODIUM CHLORIDE 3000 MG: 900 INJECTION INTRAVENOUS at 15:59

## 2024-12-06 RX ADMIN — PREDNISONE 20 MG: 20 TABLET ORAL at 08:47

## 2024-12-06 RX ADMIN — CLOTRIMAZOLE: 10 CREAM VAGINAL at 08:48

## 2024-12-06 RX ADMIN — LIDOCAINE HYDROCHLORIDE: 40 SOLUTION TOPICAL at 15:48

## 2024-12-06 RX ADMIN — CLOTRIMAZOLE: 10 CREAM VAGINAL at 21:37

## 2024-12-06 RX ADMIN — FAMOTIDINE 20 MG: 20 TABLET, FILM COATED ORAL at 08:47

## 2024-12-06 RX ADMIN — POLYETHYLENE GLYCOL 3350 17 G: 17 POWDER, FOR SOLUTION ORAL at 08:46

## 2024-12-06 ASSESSMENT — PAIN DESCRIPTION - LOCATION
LOCATION: VAGINA
LOCATION: VAGINA
LOCATION: HAND;GROIN
LOCATION: VAGINA

## 2024-12-06 ASSESSMENT — PAIN DESCRIPTION - DESCRIPTORS
DESCRIPTORS: ACHING;DISCOMFORT;THROBBING
DESCRIPTORS: DISCOMFORT
DESCRIPTORS: ACHING;DISCOMFORT

## 2024-12-06 ASSESSMENT — PAIN SCALES - GENERAL
PAINLEVEL_OUTOF10: 6
PAINLEVEL_OUTOF10: 7
PAINLEVEL_OUTOF10: 7
PAINLEVEL_OUTOF10: 5
PAINLEVEL_OUTOF10: 10
PAINLEVEL_OUTOF10: 5
PAINLEVEL_OUTOF10: 3
PAINLEVEL_OUTOF10: 6
PAINLEVEL_OUTOF10: 3

## 2024-12-06 ASSESSMENT — PAIN DESCRIPTION - ORIENTATION: ORIENTATION: LEFT

## 2024-12-06 ASSESSMENT — PAIN SCALES - WONG BAKER: WONGBAKER_NUMERICALRESPONSE: NO HURT

## 2024-12-06 NOTE — PROCEDURES
with any questions    Gregory Mcdaniel, DO  Orthopedic Surgery, PGY-1  [place], [place]

## 2024-12-06 NOTE — PLAN OF CARE
Problem: Discharge Planning  Goal: Discharge to home or other facility with appropriate resources  Outcome: Progressing     Problem: Pain  Goal: Verbalizes/displays adequate comfort level or baseline comfort level  12/5/2024 2243 by Rin Ac RN  Outcome: Progressing  12/5/2024 1105 by Tanya Espana RN  Outcome: Progressing     Problem: Chronic Conditions and Co-morbidities  Goal: Patient's chronic conditions and co-morbidity symptoms are monitored and maintained or improved  Outcome: Progressing     Problem: Skin/Tissue Integrity  Goal: Absence of new skin breakdown  Description: 1.  Monitor for areas of redness and/or skin breakdown  2.  Assess vascular access sites hourly  3.  Every 4-6 hours minimum:  Change oxygen saturation probe site  4.  Every 4-6 hours:  If on nasal continuous positive airway pressure, respiratory therapy assess nares and determine need for appliance change or resting period.  Outcome: Progressing     Problem: Safety - Adult  Goal: Free from fall injury  12/5/2024 2243 by Rin Ac RN  Outcome: Progressing  12/5/2024 1105 by Tanya Espana RN  Outcome: Progressing

## 2024-12-06 NOTE — PLAN OF CARE
Problem: Pain  Goal: Verbalizes/displays adequate comfort level or baseline comfort level  12/6/2024 1023 by Tanya Espana, RN  Outcome: Progressing     Problem: Safety - Adult  Goal: Free from fall injury  12/6/2024 1023 by Tanya Espana, RN  Outcome: Progressing

## 2024-12-07 PROBLEM — B00.9 HSV-2 (HERPES SIMPLEX VIRUS 2) INFECTION: Status: ACTIVE | Noted: 2024-12-07

## 2024-12-07 PROBLEM — D84.9 IMMUNOCOMPROMISED (HCC): Status: ACTIVE | Noted: 2024-12-07

## 2024-12-07 LAB
ALBUMIN SERPL-MCNC: 3 G/DL (ref 3.5–5.2)
ALBUMIN/GLOB SERPL: 1 {RATIO} (ref 1–2.5)
ALP SERPL-CCNC: 173 U/L (ref 35–104)
ALT SERPL-CCNC: 172 U/L (ref 10–35)
ANION GAP SERPL CALCULATED.3IONS-SCNC: 11 MMOL/L (ref 9–16)
AST SERPL-CCNC: 77 U/L (ref 10–35)
BASOPHILS # BLD: 0 K/UL (ref 0–0.2)
BASOPHILS NFR BLD: 0 % (ref 0–2)
BILIRUB SERPL-MCNC: 0.4 MG/DL (ref 0–1.2)
BUN SERPL-MCNC: 6 MG/DL (ref 6–20)
CALCIUM SERPL-MCNC: 7.8 MG/DL (ref 8.6–10.4)
CELLS COUNTED: 50
CHLORIDE SERPL-SCNC: 102 MMOL/L (ref 98–107)
CO2 SERPL-SCNC: 24 MMOL/L (ref 20–31)
CREAT SERPL-MCNC: 0.4 MG/DL (ref 0.6–0.9)
CRP SERPL HS-MCNC: 85.1 MG/L (ref 0–5)
EOSINOPHIL # BLD: 0 K/UL (ref 0–0.4)
EOSINOPHILS RELATIVE PERCENT: 0 % (ref 1–4)
ERYTHROCYTE [DISTWIDTH] IN BLOOD BY AUTOMATED COUNT: 15 % (ref 11.8–14.4)
GFR, ESTIMATED: >90 ML/MIN/1.73M2
GLUCOSE SERPL-MCNC: 125 MG/DL (ref 74–99)
HCT VFR BLD AUTO: 24.6 % (ref 36.3–47.1)
HGB BLD-MCNC: 7.8 G/DL (ref 11.9–15.1)
IMM GRANULOCYTES # BLD AUTO: 0 K/UL (ref 0–0.3)
IMM GRANULOCYTES NFR BLD: 0 %
LYMPHOCYTES NFR BLD: 0.38 K/UL (ref 1–4.8)
LYMPHOCYTES RELATIVE PERCENT: 48 % (ref 24–44)
MCH RBC QN AUTO: 31.6 PG (ref 25.2–33.5)
MCHC RBC AUTO-ENTMCNC: 31.7 G/DL (ref 28.4–34.8)
MCV RBC AUTO: 99.6 FL (ref 82.6–102.9)
MONOCYTES NFR BLD: 0.02 K/UL (ref 0.1–0.8)
MONOCYTES NFR BLD: 2 % (ref 1–7)
MORPHOLOGY: ABNORMAL
MORPHOLOGY: ABNORMAL
NEUTROPHILS NFR BLD: 50 % (ref 36–66)
NEUTS SEG NFR BLD: 0.4 K/UL (ref 1.8–7.7)
NRBC BLD-RTO: 2.4 PER 100 WBC
PLATELET # BLD AUTO: 135 K/UL (ref 138–453)
PMV BLD AUTO: 11.8 FL (ref 8.1–13.5)
POTASSIUM SERPL-SCNC: 4.1 MMOL/L (ref 3.7–5.3)
PROT SERPL-MCNC: 6 G/DL (ref 6.6–8.7)
RBC # BLD AUTO: 2.47 M/UL (ref 3.95–5.11)
SODIUM SERPL-SCNC: 137 MMOL/L (ref 136–145)
WBC OTHER # BLD: 0.8 K/UL (ref 3.5–11.3)

## 2024-12-07 PROCEDURE — 6360000002 HC RX W HCPCS: Performed by: INTERNAL MEDICINE

## 2024-12-07 PROCEDURE — 36415 COLL VENOUS BLD VENIPUNCTURE: CPT

## 2024-12-07 PROCEDURE — 80053 COMPREHEN METABOLIC PANEL: CPT

## 2024-12-07 PROCEDURE — 6370000000 HC RX 637 (ALT 250 FOR IP): Performed by: INTERNAL MEDICINE

## 2024-12-07 PROCEDURE — 2060000000 HC ICU INTERMEDIATE R&B

## 2024-12-07 PROCEDURE — 99232 SBSQ HOSP IP/OBS MODERATE 35: CPT | Performed by: INTERNAL MEDICINE

## 2024-12-07 PROCEDURE — 86140 C-REACTIVE PROTEIN: CPT

## 2024-12-07 PROCEDURE — 2580000003 HC RX 258: Performed by: INTERNAL MEDICINE

## 2024-12-07 PROCEDURE — 6370000000 HC RX 637 (ALT 250 FOR IP): Performed by: NURSE PRACTITIONER

## 2024-12-07 PROCEDURE — 85025 COMPLETE CBC W/AUTO DIFF WBC: CPT

## 2024-12-07 RX ADMIN — SODIUM CHLORIDE 3000 MG: 900 INJECTION INTRAVENOUS at 22:25

## 2024-12-07 RX ADMIN — LIDOCAINE HYDROCHLORIDE: 40 SOLUTION TOPICAL at 15:55

## 2024-12-07 RX ADMIN — MICONAZOLE NITRATE: 20 CREAM TOPICAL at 22:09

## 2024-12-07 RX ADMIN — FAMOTIDINE 20 MG: 20 TABLET, FILM COATED ORAL at 08:06

## 2024-12-07 RX ADMIN — SODIUM CHLORIDE 3000 MG: 900 INJECTION INTRAVENOUS at 03:59

## 2024-12-07 RX ADMIN — PREDNISONE 20 MG: 20 TABLET ORAL at 08:06

## 2024-12-07 RX ADMIN — POLYETHYLENE GLYCOL 3350 17 G: 17 POWDER, FOR SOLUTION ORAL at 08:06

## 2024-12-07 RX ADMIN — IBUPROFEN 600 MG: 600 TABLET, FILM COATED ORAL at 23:25

## 2024-12-07 RX ADMIN — CLOTRIMAZOLE: 10 CREAM VAGINAL at 09:57

## 2024-12-07 RX ADMIN — MICONAZOLE NITRATE: 20 CREAM TOPICAL at 09:56

## 2024-12-07 RX ADMIN — POLYETHYLENE GLYCOL 3350 17 G: 17 POWDER, FOR SOLUTION ORAL at 22:05

## 2024-12-07 RX ADMIN — ACYCLOVIR SODIUM 390 MG: 50 INJECTION, SOLUTION INTRAVENOUS at 11:10

## 2024-12-07 RX ADMIN — SODIUM CHLORIDE 3000 MG: 900 INJECTION INTRAVENOUS at 15:54

## 2024-12-07 RX ADMIN — ACYCLOVIR SODIUM 390 MG: 50 INJECTION, SOLUTION INTRAVENOUS at 02:11

## 2024-12-07 RX ADMIN — IBUPROFEN 600 MG: 600 TABLET, FILM COATED ORAL at 02:08

## 2024-12-07 RX ADMIN — SODIUM CHLORIDE 3000 MG: 900 INJECTION INTRAVENOUS at 09:53

## 2024-12-07 RX ADMIN — CLOTRIMAZOLE: 10 CREAM VAGINAL at 22:10

## 2024-12-07 RX ADMIN — DOCUSATE SODIUM 200 MG: 100 CAPSULE, LIQUID FILLED ORAL at 08:06

## 2024-12-07 RX ADMIN — FAMOTIDINE 20 MG: 20 TABLET, FILM COATED ORAL at 22:05

## 2024-12-07 RX ADMIN — PREDNISONE 20 MG: 20 TABLET ORAL at 22:05

## 2024-12-07 RX ADMIN — ENOXAPARIN SODIUM 30 MG: 100 INJECTION SUBCUTANEOUS at 08:07

## 2024-12-07 RX ADMIN — SODIUM CHLORIDE, PRESERVATIVE FREE 10 ML: 5 INJECTION INTRAVENOUS at 08:08

## 2024-12-07 RX ADMIN — DOCUSATE SODIUM 200 MG: 100 CAPSULE, LIQUID FILLED ORAL at 22:05

## 2024-12-07 RX ADMIN — LIDOCAINE HYDROCHLORIDE 15 ML: 20 SOLUTION ORAL; TOPICAL at 08:18

## 2024-12-07 RX ADMIN — ENOXAPARIN SODIUM 30 MG: 100 INJECTION SUBCUTANEOUS at 22:05

## 2024-12-07 RX ADMIN — POLYMYXIN B SULFATE, BACITRACIN ZINC, NEOMYCIN SULFATE: 5000; 3.5; 4 OINTMENT TOPICAL at 22:09

## 2024-12-07 RX ADMIN — ACYCLOVIR SODIUM 390 MG: 50 INJECTION, SOLUTION INTRAVENOUS at 18:13

## 2024-12-07 RX ADMIN — LIDOCAINE HYDROCHLORIDE 15 ML: 20 SOLUTION ORAL; TOPICAL at 14:31

## 2024-12-07 RX ADMIN — LIDOCAINE HYDROCHLORIDE 15 ML: 20 SOLUTION ORAL; TOPICAL at 22:10

## 2024-12-07 RX ADMIN — POLYMYXIN B SULFATE, BACITRACIN ZINC, NEOMYCIN SULFATE: 5000; 3.5; 4 OINTMENT TOPICAL at 10:07

## 2024-12-07 ASSESSMENT — PAIN DESCRIPTION - DESCRIPTORS
DESCRIPTORS: BURNING

## 2024-12-07 ASSESSMENT — PAIN DESCRIPTION - LOCATION
LOCATION: VAGINA
LOCATION: VAGINA
LOCATION: MOUTH

## 2024-12-07 ASSESSMENT — PAIN SCALES - GENERAL
PAINLEVEL_OUTOF10: 0
PAINLEVEL_OUTOF10: 6
PAINLEVEL_OUTOF10: 6

## 2024-12-07 NOTE — PLAN OF CARE
Problem: Pain  Goal: Verbalizes/displays adequate comfort level or baseline comfort level  Outcome: Progressing     Problem: Chronic Conditions and Co-morbidities  Goal: Patient's chronic conditions and co-morbidity symptoms are monitored and maintained or improved  Outcome: Progressing     Problem: Skin/Tissue Integrity  Goal: Absence of new skin breakdown  Description: 1.  Monitor for areas of redness and/or skin breakdown  2.  Assess vascular access sites hourly  3.  Every 4-6 hours minimum:  Change oxygen saturation probe site  4.  Every 4-6 hours:  If on nasal continuous positive airway pressure, respiratory therapy assess nares and determine need for appliance change or resting period.  Outcome: Progressing     Problem: Safety - Adult  Goal: Free from fall injury  Outcome: Progressing     Problem: Discharge Planning  Goal: Discharge to home or other facility with appropriate resources  Outcome: Progressing

## 2024-12-08 PROCEDURE — 6370000000 HC RX 637 (ALT 250 FOR IP): Performed by: INTERNAL MEDICINE

## 2024-12-08 PROCEDURE — 6360000002 HC RX W HCPCS: Performed by: INTERNAL MEDICINE

## 2024-12-08 PROCEDURE — 2580000003 HC RX 258: Performed by: INTERNAL MEDICINE

## 2024-12-08 PROCEDURE — APPSS30 APP SPLIT SHARED TIME 16-30 MINUTES: Performed by: NURSE PRACTITIONER

## 2024-12-08 PROCEDURE — 6370000000 HC RX 637 (ALT 250 FOR IP): Performed by: NURSE PRACTITIONER

## 2024-12-08 PROCEDURE — 2060000000 HC ICU INTERMEDIATE R&B

## 2024-12-08 PROCEDURE — 6370000000 HC RX 637 (ALT 250 FOR IP)

## 2024-12-08 PROCEDURE — 99232 SBSQ HOSP IP/OBS MODERATE 35: CPT | Performed by: INTERNAL MEDICINE

## 2024-12-08 RX ORDER — SENNA AND DOCUSATE SODIUM 50; 8.6 MG/1; MG/1
2 TABLET, FILM COATED ORAL DAILY
Status: DISCONTINUED | OUTPATIENT
Start: 2024-12-08 | End: 2024-12-10 | Stop reason: HOSPADM

## 2024-12-08 RX ADMIN — POLYMYXIN B SULFATE, BACITRACIN ZINC, NEOMYCIN SULFATE: 5000; 3.5; 4 OINTMENT TOPICAL at 11:11

## 2024-12-08 RX ADMIN — FAMOTIDINE 20 MG: 20 TABLET, FILM COATED ORAL at 07:54

## 2024-12-08 RX ADMIN — SODIUM CHLORIDE 3000 MG: 900 INJECTION INTRAVENOUS at 04:22

## 2024-12-08 RX ADMIN — DOCUSATE SODIUM 200 MG: 100 CAPSULE, LIQUID FILLED ORAL at 07:54

## 2024-12-08 RX ADMIN — SODIUM CHLORIDE 3000 MG: 900 INJECTION INTRAVENOUS at 09:59

## 2024-12-08 RX ADMIN — LIDOCAINE HYDROCHLORIDE 15 ML: 20 SOLUTION ORAL; TOPICAL at 07:55

## 2024-12-08 RX ADMIN — SODIUM CHLORIDE 3000 MG: 900 INJECTION INTRAVENOUS at 15:24

## 2024-12-08 RX ADMIN — SENNOSIDES AND DOCUSATE SODIUM 2 TABLET: 50; 8.6 TABLET ORAL at 14:57

## 2024-12-08 RX ADMIN — ACYCLOVIR SODIUM 390 MG: 50 INJECTION, SOLUTION INTRAVENOUS at 03:07

## 2024-12-08 RX ADMIN — SODIUM CHLORIDE, PRESERVATIVE FREE 10 ML: 5 INJECTION INTRAVENOUS at 21:02

## 2024-12-08 RX ADMIN — ACYCLOVIR SODIUM 390 MG: 50 INJECTION, SOLUTION INTRAVENOUS at 16:03

## 2024-12-08 RX ADMIN — MICONAZOLE NITRATE: 20 CREAM TOPICAL at 21:01

## 2024-12-08 RX ADMIN — LIDOCAINE HYDROCHLORIDE 15 ML: 20 SOLUTION ORAL; TOPICAL at 21:01

## 2024-12-08 RX ADMIN — SODIUM CHLORIDE, PRESERVATIVE FREE 10 ML: 5 INJECTION INTRAVENOUS at 07:54

## 2024-12-08 RX ADMIN — IBUPROFEN 600 MG: 600 TABLET, FILM COATED ORAL at 21:00

## 2024-12-08 RX ADMIN — ENOXAPARIN SODIUM 30 MG: 100 INJECTION SUBCUTANEOUS at 21:01

## 2024-12-08 RX ADMIN — MICONAZOLE NITRATE: 20 CREAM TOPICAL at 07:55

## 2024-12-08 RX ADMIN — CLOTRIMAZOLE: 10 CREAM VAGINAL at 07:55

## 2024-12-08 RX ADMIN — PREDNISONE 20 MG: 20 TABLET ORAL at 07:54

## 2024-12-08 RX ADMIN — SODIUM CHLORIDE 3000 MG: 900 INJECTION INTRAVENOUS at 22:35

## 2024-12-08 RX ADMIN — FAMOTIDINE 20 MG: 20 TABLET, FILM COATED ORAL at 21:00

## 2024-12-08 RX ADMIN — ENOXAPARIN SODIUM 30 MG: 100 INJECTION SUBCUTANEOUS at 07:54

## 2024-12-08 RX ADMIN — POLYMYXIN B SULFATE, BACITRACIN ZINC, NEOMYCIN SULFATE: 5000; 3.5; 4 OINTMENT TOPICAL at 21:01

## 2024-12-08 RX ADMIN — ACYCLOVIR SODIUM 390 MG: 50 INJECTION, SOLUTION INTRAVENOUS at 11:04

## 2024-12-08 RX ADMIN — CLOTRIMAZOLE: 10 CREAM VAGINAL at 21:01

## 2024-12-08 RX ADMIN — PREDNISONE 20 MG: 20 TABLET ORAL at 21:00

## 2024-12-08 RX ADMIN — LIDOCAINE HYDROCHLORIDE 15 ML: 20 SOLUTION ORAL; TOPICAL at 14:57

## 2024-12-08 ASSESSMENT — PAIN DESCRIPTION - LOCATION
LOCATION: MOUTH
LOCATION: MOUTH
LOCATION: VAGINA

## 2024-12-08 ASSESSMENT — PAIN DESCRIPTION - PAIN TYPE: TYPE: ACUTE PAIN

## 2024-12-08 ASSESSMENT — PAIN SCALES - GENERAL
PAINLEVEL_OUTOF10: 6
PAINLEVEL_OUTOF10: 0

## 2024-12-08 NOTE — PLAN OF CARE

## 2024-12-09 LAB
ALBUMIN SERPL-MCNC: 3.6 G/DL (ref 3.5–5.2)
ALBUMIN/GLOB SERPL: 1 {RATIO} (ref 1–2.5)
ALP SERPL-CCNC: 174 U/L (ref 35–104)
ALT SERPL-CCNC: 118 U/L (ref 10–35)
ANION GAP SERPL CALCULATED.3IONS-SCNC: 12 MMOL/L (ref 9–16)
AST SERPL-CCNC: 89 U/L (ref 10–35)
BACTERIA URNS QL MICRO: NORMAL
BASOPHILS # BLD: 0 K/UL (ref 0–0.2)
BASOPHILS NFR BLD: 0 % (ref 0–2)
BILIRUB SERPL-MCNC: 0.5 MG/DL (ref 0–1.2)
BILIRUB UR QL STRIP: NEGATIVE
BUN SERPL-MCNC: 7 MG/DL (ref 6–20)
CALCIUM SERPL-MCNC: 8.9 MG/DL (ref 8.6–10.4)
CASTS #/AREA URNS LPF: NORMAL /LPF (ref 0–8)
CHLORIDE SERPL-SCNC: 101 MMOL/L (ref 98–107)
CLARITY UR: CLEAR
CO2 SERPL-SCNC: 24 MMOL/L (ref 20–31)
COLOR UR: YELLOW
CREAT SERPL-MCNC: 0.5 MG/DL (ref 0.6–0.9)
EOSINOPHIL # BLD: 0 K/UL (ref 0–0.4)
EOSINOPHILS RELATIVE PERCENT: 0 % (ref 1–4)
EPI CELLS #/AREA URNS HPF: NORMAL /HPF (ref 0–5)
ERYTHROCYTE [DISTWIDTH] IN BLOOD BY AUTOMATED COUNT: 15.1 % (ref 11.8–14.4)
GFR, ESTIMATED: >90 ML/MIN/1.73M2
GLUCOSE SERPL-MCNC: 101 MG/DL (ref 74–99)
GLUCOSE UR STRIP-MCNC: NEGATIVE MG/DL
HCT VFR BLD AUTO: 27.5 % (ref 36.3–47.1)
HGB BLD-MCNC: 8.9 G/DL (ref 11.9–15.1)
HGB UR QL STRIP.AUTO: NEGATIVE
IMM GRANULOCYTES # BLD AUTO: 0.01 K/UL (ref 0–0.3)
IMM GRANULOCYTES NFR BLD: 1 %
KETONES UR STRIP-MCNC: NEGATIVE MG/DL
LEUKOCYTE ESTERASE UR QL STRIP: ABNORMAL
LYMPHOCYTES NFR BLD: 0.42 K/UL (ref 1–4.8)
LYMPHOCYTES RELATIVE PERCENT: 35 % (ref 24–44)
MCH RBC QN AUTO: 31.6 PG (ref 25.2–33.5)
MCHC RBC AUTO-ENTMCNC: 32.4 G/DL (ref 28.4–34.8)
MCV RBC AUTO: 97.5 FL (ref 82.6–102.9)
MONOCYTES NFR BLD: 0.02 K/UL (ref 0.1–0.8)
MONOCYTES NFR BLD: 2 % (ref 1–7)
MORPHOLOGY: ABNORMAL
MORPHOLOGY: ABNORMAL
NEUTROPHILS NFR BLD: 62 % (ref 36–66)
NEUTS SEG NFR BLD: 0.75 K/UL (ref 1.8–7.7)
NITRITE UR QL STRIP: NEGATIVE
NRBC BLD-RTO: 2.6 PER 100 WBC
NUCLEATED RED BLOOD CELLS: 1 PER 100 WBC
PH UR STRIP: 8 [PH] (ref 5–8)
PLATELET # BLD AUTO: 172 K/UL (ref 138–453)
PMV BLD AUTO: 12.5 FL (ref 8.1–13.5)
POTASSIUM SERPL-SCNC: 4.3 MMOL/L (ref 3.7–5.3)
PROT SERPL-MCNC: 7.2 G/DL (ref 6.6–8.7)
PROT UR STRIP-MCNC: NEGATIVE MG/DL
RBC # BLD AUTO: 2.82 M/UL (ref 3.95–5.11)
RBC #/AREA URNS HPF: NORMAL /HPF (ref 0–4)
SODIUM SERPL-SCNC: 137 MMOL/L (ref 136–145)
SP GR UR STRIP: 1.01 (ref 1–1.03)
UROBILINOGEN UR STRIP-ACNC: NORMAL EU/DL (ref 0–1)
WBC #/AREA URNS HPF: NORMAL /HPF (ref 0–5)
WBC OTHER # BLD: 1.2 K/UL (ref 3.5–11.3)

## 2024-12-09 PROCEDURE — 80053 COMPREHEN METABOLIC PANEL: CPT

## 2024-12-09 PROCEDURE — 6360000002 HC RX W HCPCS: Performed by: INTERNAL MEDICINE

## 2024-12-09 PROCEDURE — 85025 COMPLETE CBC W/AUTO DIFF WBC: CPT

## 2024-12-09 PROCEDURE — 81001 URINALYSIS AUTO W/SCOPE: CPT

## 2024-12-09 PROCEDURE — 99232 SBSQ HOSP IP/OBS MODERATE 35: CPT | Performed by: INTERNAL MEDICINE

## 2024-12-09 PROCEDURE — 2060000000 HC ICU INTERMEDIATE R&B

## 2024-12-09 PROCEDURE — 36415 COLL VENOUS BLD VENIPUNCTURE: CPT

## 2024-12-09 PROCEDURE — 2500000003 HC RX 250 WO HCPCS: Performed by: INTERNAL MEDICINE

## 2024-12-09 PROCEDURE — 6370000000 HC RX 637 (ALT 250 FOR IP): Performed by: INTERNAL MEDICINE

## 2024-12-09 PROCEDURE — 6370000000 HC RX 637 (ALT 250 FOR IP): Performed by: NURSE PRACTITIONER

## 2024-12-09 PROCEDURE — 2580000003 HC RX 258: Performed by: INTERNAL MEDICINE

## 2024-12-09 PROCEDURE — 99231 SBSQ HOSP IP/OBS SF/LOW 25: CPT | Performed by: INTERNAL MEDICINE

## 2024-12-09 RX ORDER — PREDNISONE 10 MG/1
5 TABLET ORAL 2 TIMES DAILY
Status: DISCONTINUED | OUTPATIENT
Start: 2024-12-09 | End: 2024-12-10 | Stop reason: HOSPADM

## 2024-12-09 RX ADMIN — ACYCLOVIR SODIUM 390 MG: 50 INJECTION, SOLUTION INTRAVENOUS at 10:28

## 2024-12-09 RX ADMIN — FAMOTIDINE 20 MG: 20 TABLET, FILM COATED ORAL at 22:05

## 2024-12-09 RX ADMIN — MICONAZOLE NITRATE: 20 CREAM TOPICAL at 11:23

## 2024-12-09 RX ADMIN — PREDNISONE 20 MG: 20 TABLET ORAL at 10:02

## 2024-12-09 RX ADMIN — ACYCLOVIR SODIUM 390 MG: 50 INJECTION, SOLUTION INTRAVENOUS at 02:41

## 2024-12-09 RX ADMIN — SODIUM CHLORIDE 3000 MG: 900 INJECTION INTRAVENOUS at 11:53

## 2024-12-09 RX ADMIN — POLYMYXIN B SULFATE, BACITRACIN ZINC, NEOMYCIN SULFATE: 5000; 3.5; 4 OINTMENT TOPICAL at 10:10

## 2024-12-09 RX ADMIN — SODIUM CHLORIDE, PRESERVATIVE FREE 10 ML: 5 INJECTION INTRAVENOUS at 22:05

## 2024-12-09 RX ADMIN — IBUPROFEN 600 MG: 600 TABLET, FILM COATED ORAL at 22:12

## 2024-12-09 RX ADMIN — LIDOCAINE HYDROCHLORIDE: 40 SOLUTION TOPICAL at 17:43

## 2024-12-09 RX ADMIN — SODIUM CHLORIDE 3000 MG: 900 INJECTION INTRAVENOUS at 04:35

## 2024-12-09 RX ADMIN — SODIUM CHLORIDE, PRESERVATIVE FREE 10 ML: 5 INJECTION INTRAVENOUS at 11:25

## 2024-12-09 RX ADMIN — MICONAZOLE NITRATE: 20 CREAM TOPICAL at 22:13

## 2024-12-09 RX ADMIN — ENOXAPARIN SODIUM 30 MG: 100 INJECTION SUBCUTANEOUS at 22:05

## 2024-12-09 RX ADMIN — LIDOCAINE HYDROCHLORIDE 15 ML: 20 SOLUTION ORAL; TOPICAL at 11:24

## 2024-12-09 RX ADMIN — ACYCLOVIR SODIUM 390 MG: 50 INJECTION, SOLUTION INTRAVENOUS at 18:36

## 2024-12-09 RX ADMIN — SODIUM CHLORIDE 3000 MG: 900 INJECTION INTRAVENOUS at 17:42

## 2024-12-09 RX ADMIN — FAMOTIDINE 20 MG: 20 TABLET, FILM COATED ORAL at 10:03

## 2024-12-09 RX ADMIN — LIDOCAINE HYDROCHLORIDE 15 ML: 20 SOLUTION ORAL; TOPICAL at 22:05

## 2024-12-09 RX ADMIN — LIDOCAINE HYDROCHLORIDE 15 ML: 20 SOLUTION ORAL; TOPICAL at 17:40

## 2024-12-09 RX ADMIN — CLOTRIMAZOLE: 10 CREAM VAGINAL at 11:24

## 2024-12-09 RX ADMIN — SODIUM CHLORIDE 3000 MG: 900 INJECTION INTRAVENOUS at 22:24

## 2024-12-09 RX ADMIN — OXYCODONE 5 MG: 5 TABLET ORAL at 10:02

## 2024-12-09 RX ADMIN — CLOTRIMAZOLE: 10 CREAM VAGINAL at 22:13

## 2024-12-09 RX ADMIN — PREDNISONE 5 MG: 10 TABLET ORAL at 22:05

## 2024-12-09 RX ADMIN — ENOXAPARIN SODIUM 30 MG: 100 INJECTION SUBCUTANEOUS at 10:07

## 2024-12-09 ASSESSMENT — ENCOUNTER SYMPTOMS
EYE ITCHING: 0
EYE PAIN: 0
APNEA: 0
FACIAL SWELLING: 0
EYE DISCHARGE: 0
SHORTNESS OF BREATH: 0
ABDOMINAL DISTENTION: 0
COUGH: 0
EYE REDNESS: 0
PHOTOPHOBIA: 0

## 2024-12-09 ASSESSMENT — PAIN DESCRIPTION - ORIENTATION: ORIENTATION: RIGHT;LEFT

## 2024-12-09 ASSESSMENT — PAIN DESCRIPTION - DESCRIPTORS: DESCRIPTORS: ACHING

## 2024-12-09 ASSESSMENT — PAIN SCALES - GENERAL
PAINLEVEL_OUTOF10: 3
PAINLEVEL_OUTOF10: 1
PAINLEVEL_OUTOF10: 4

## 2024-12-09 ASSESSMENT — PAIN DESCRIPTION - LOCATION
LOCATION: HEAD
LOCATION: OTHER (COMMENT)

## 2024-12-09 NOTE — PLAN OF CARE
Problem: Discharge Planning  Goal: Discharge to home or other facility with appropriate resources  12/9/2024 1237 by Maria Isabel Enriquez RN  Outcome: Progressing  12/9/2024 0110 by Mihcelle Marti RN  Outcome: Progressing     Problem: Pain  Goal: Verbalizes/displays adequate comfort level or baseline comfort level  12/9/2024 1237 by Maria Isabel Enriquez RN  Outcome: Progressing  12/9/2024 0110 by Michelle Marti RN  Outcome: Progressing     Problem: Chronic Conditions and Co-morbidities  Goal: Patient's chronic conditions and co-morbidity symptoms are monitored and maintained or improved  12/9/2024 1237 by Maria Isabel Enriquez RN  Outcome: Progressing  12/9/2024 0110 by Michelle Marti RN  Outcome: Progressing     Problem: Skin/Tissue Integrity  Goal: Absence of new skin breakdown  Description: 1.  Monitor for areas of redness and/or skin breakdown  2.  Assess vascular access sites hourly  3.  Every 4-6 hours minimum:  Change oxygen saturation probe site  4.  Every 4-6 hours:  If on nasal continuous positive airway pressure, respiratory therapy assess nares and determine need for appliance change or resting period.  12/9/2024 1237 by Maria Isabel Enriquez RN  Outcome: Progressing  12/9/2024 0110 by Michelle Marti RN  Outcome: Progressing     Problem: Safety - Adult  Goal: Free from fall injury  12/9/2024 1237 by Maria Isabel Enriquez RN  Outcome: Progressing  12/9/2024 0110 by Michelle Marti RN  Outcome: Progressing     Problem: ABCDS Injury Assessment  Goal: Absence of physical injury  12/9/2024 1237 by Maria Isabel Enriquez RN  Outcome: Progressing  12/9/2024 0110 by Michelle Marti RN  Outcome: Progressing

## 2024-12-10 VITALS
HEART RATE: 115 BPM | TEMPERATURE: 98.2 F | WEIGHT: 253.31 LBS | HEIGHT: 64 IN | BODY MASS INDEX: 43.25 KG/M2 | RESPIRATION RATE: 18 BRPM | DIASTOLIC BLOOD PRESSURE: 79 MMHG | OXYGEN SATURATION: 100 % | SYSTOLIC BLOOD PRESSURE: 140 MMHG

## 2024-12-10 PROBLEM — A60.00 HERPES SIMPLEX INFECTION OF GENITOURINARY SYSTEM: Status: ACTIVE | Noted: 2024-12-07

## 2024-12-10 PROCEDURE — 2580000003 HC RX 258: Performed by: INTERNAL MEDICINE

## 2024-12-10 PROCEDURE — 6360000002 HC RX W HCPCS: Performed by: INTERNAL MEDICINE

## 2024-12-10 PROCEDURE — 2500000003 HC RX 250 WO HCPCS: Performed by: INTERNAL MEDICINE

## 2024-12-10 PROCEDURE — 99232 SBSQ HOSP IP/OBS MODERATE 35: CPT | Performed by: INTERNAL MEDICINE

## 2024-12-10 PROCEDURE — 6370000000 HC RX 637 (ALT 250 FOR IP): Performed by: INTERNAL MEDICINE

## 2024-12-10 PROCEDURE — 99239 HOSP IP/OBS DSCHRG MGMT >30: CPT | Performed by: STUDENT IN AN ORGANIZED HEALTH CARE EDUCATION/TRAINING PROGRAM

## 2024-12-10 RX ORDER — NEOMYCIN/BACITRACIN/POLYMYXINB 3.5-400-5K
OINTMENT (GRAM) TOPICAL
Qty: 5 G | Refills: 3 | Status: SHIPPED | OUTPATIENT
Start: 2024-12-10

## 2024-12-10 RX ORDER — PREDNISONE 5 MG/1
5 TABLET ORAL 2 TIMES DAILY
Qty: 20 TABLET | Refills: 0 | Status: SHIPPED | OUTPATIENT
Start: 2024-12-10 | End: 2024-12-20

## 2024-12-10 RX ORDER — ACYCLOVIR 800 MG/1
800 TABLET ORAL 3 TIMES DAILY
Qty: 42 TABLET | Refills: 0 | Status: SHIPPED | OUTPATIENT
Start: 2024-12-10 | End: 2024-12-24

## 2024-12-10 RX ORDER — ACYCLOVIR 400 MG/1
400 TABLET ORAL 2 TIMES DAILY
Qty: 60 TABLET | Refills: 11 | Status: SHIPPED | OUTPATIENT
Start: 2024-12-10 | End: 2025-12-05

## 2024-12-10 RX ADMIN — ENOXAPARIN SODIUM 30 MG: 100 INJECTION SUBCUTANEOUS at 08:45

## 2024-12-10 RX ADMIN — ACYCLOVIR SODIUM 390 MG: 50 INJECTION, SOLUTION INTRAVENOUS at 11:26

## 2024-12-10 RX ADMIN — MICONAZOLE NITRATE: 20 CREAM TOPICAL at 08:47

## 2024-12-10 RX ADMIN — POLYMYXIN B SULFATE, BACITRACIN ZINC, NEOMYCIN SULFATE: 5000; 3.5; 4 OINTMENT TOPICAL at 08:46

## 2024-12-10 RX ADMIN — LIDOCAINE HYDROCHLORIDE 15 ML: 20 SOLUTION ORAL; TOPICAL at 15:08

## 2024-12-10 RX ADMIN — SODIUM CHLORIDE 3000 MG: 900 INJECTION INTRAVENOUS at 10:34

## 2024-12-10 RX ADMIN — LIDOCAINE HYDROCHLORIDE 15 ML: 20 SOLUTION ORAL; TOPICAL at 10:33

## 2024-12-10 RX ADMIN — PREDNISONE 5 MG: 10 TABLET ORAL at 08:45

## 2024-12-10 RX ADMIN — FAMOTIDINE 20 MG: 20 TABLET, FILM COATED ORAL at 08:44

## 2024-12-10 RX ADMIN — SODIUM CHLORIDE, PRESERVATIVE FREE 10 ML: 5 INJECTION INTRAVENOUS at 08:44

## 2024-12-10 RX ADMIN — SODIUM CHLORIDE 3000 MG: 900 INJECTION INTRAVENOUS at 04:53

## 2024-12-10 RX ADMIN — ACYCLOVIR SODIUM 390 MG: 50 INJECTION, SOLUTION INTRAVENOUS at 02:20

## 2024-12-10 RX ADMIN — CLOTRIMAZOLE: 10 CREAM VAGINAL at 08:47

## 2024-12-10 ASSESSMENT — PAIN SCALES - GENERAL: PAINLEVEL_OUTOF10: 4

## 2024-12-10 ASSESSMENT — ENCOUNTER SYMPTOMS
EYE PAIN: 0
ABDOMINAL DISTENTION: 0
EYE DISCHARGE: 0
SHORTNESS OF BREATH: 0
COUGH: 0
APNEA: 0
FACIAL SWELLING: 0
PHOTOPHOBIA: 0
EYE REDNESS: 0
EYE ITCHING: 0

## 2024-12-10 NOTE — CARE COORDINATION
Discharge Report    [facility]  Clinical Case Management Department  Written by: Stephany Ozuna RN    Patient Name: Patricia Bansal  Attending Provider: Jimbo Dotson DO  Admit Date: 2024  2:43 PM  MRN: [id number]  Account: [id number]                     : 2000  Discharge Date: 12/10      Disposition: home    Stephany Ozuna RN

## 2024-12-10 NOTE — PLAN OF CARE
Problem: Pain  Goal: Verbalizes/displays adequate comfort level or baseline comfort level  Outcome: Progressing     Problem: Chronic Conditions and Co-morbidities  Goal: Patient's chronic conditions and co-morbidity symptoms are monitored and maintained or improved  Outcome: Progressing     Problem: Skin/Tissue Integrity  Goal: Absence of new skin breakdown  Description: 1.  Monitor for areas of redness and/or skin breakdown  2.  Assess vascular access sites hourly  3.  Every 4-6 hours minimum:  Change oxygen saturation probe site  4.  Every 4-6 hours:  If on nasal continuous positive airway pressure, respiratory therapy assess nares and determine need for appliance change or resting period.  Outcome: Progressing     Problem: Safety - Adult  Goal: Free from fall injury  Outcome: Progressing     Problem: ABCDS Injury Assessment  Goal: Absence of physical injury  Outcome: Progressing

## 2024-12-10 NOTE — PLAN OF CARE
Problem: Discharge Planning  Goal: Discharge to home or other facility with appropriate resources  12/10/2024 1108 by Radha Mcguire RN  Outcome: Progressing  12/10/2024 0600 by Ofe Rai RN  Outcome: Progressing     Problem: Pain  Goal: Verbalizes/displays adequate comfort level or baseline comfort level  12/10/2024 1108 by Radha Mcguire RN  Outcome: Progressing  12/10/2024 0600 by Ofe Rai, RN  Outcome: Progressing

## 2024-12-10 NOTE — PROGRESS NOTES
Orthopedic Progress Note    Patient:  Patricia Bansal  YOB: 2000     24 y.o. female    Subjective:  Patient seen and examined this morning. Patient states her finger pain and inflammation have improved. No new complaints or concerns. No issues overnight per nursing. She continues to have significant vaginal pain especially when urinating. Finger pain is well controlled on current regimen. Denies HA, CP, SOB, N/V, new numbness/tingling. Culture results of the finger unfortunately showed that the specimen contains squamous epithelial cells and therefore is suboptimal for anaerobic culture. Infectious disease agrees with current regimen of cefepime and vancomycin.     Vitals reviewed, afebrile    Objective:   Vitals:    12/02/24 0305   BP: 117/66   Pulse: 90   Resp: 16   Temp: 99.1 °F (37.3 °C)   SpO2: 97%     Gen: NAD, cooperative    Cardiovascular: Regular rate   Respiratory: No acute respiratory distress, breathing comfortably    Orthopedic Exam  Left hand: Left hand: Soft dressing C/D/I, removed. 1 x 1 cm blister overlying the eponychium of the left middle finger s/p deroofing.  Erythema with diffuse pockets of purulence surrounding the nail. No active drainage, positive fluctuance. Discoloration of the residual nail.  Trephination of the nail. Unable to flex or extend the digit at the DIP joint.  Able to flex digit at the PIP and MCP joints.  Unable to make closed fist.  No tenderness along the flexor tendon sheath or A1 pulley.  Sensation intact to light touch throughout the hand and middle finger.  Fingers are warm and well-perfused with BCR.             Recent Labs     12/02/24  0412   WBC 0.6*   HGB 7.8*   HCT 24.4*   PLT See Reflexed IPF Result   INR 1.4   *   K 3.6*   BUN 6   CREATININE 0.5*   GLUCOSE 114*      Meds:   Abx: Cefepime, vancomycin, acyclovir   DVT ppx: Lovenox  See rec for complete list    Impression 24 y.o. female being seen for:    - Left middle finger paronychia 
           Plastic Surgery Progress Note    Patient:  Patricia Bansal  YOB: 2000     24 y.o. female    Subjective:  Patient seen and examined this morning. No complaints or concerns. No issues overnight per nursing.  Denies fever, HA, CP, SOB, N/V, dysuria, new numbness/tingling.   Patient states that her pain in her left middle finger has drastically improved and has not had pain for the past couple of days after her nail being removed.  Nursing has been doing twice daily dressing changes with bacitracin and soft dressings.    Vitals reviewed, afebrile    Objective:   Vitals:    12/09/24 0345   BP: 120/66   Pulse: 59   Resp: 16   Temp: 97.8 °F (36.6 °C)   SpO2:      Gen: NAD, cooperative    Cardiovascular: Regular rate   Respiratory: No acute respiratory distress, breathing comfortably    Plastic surgery exam  Left hand: Soft dressings were in place to left middle finger which were removed.  Finger was moist from bacitracin ointment.  Obvious fluctuance or purulence able to be expressed from the finger.  There does appear to be some maceration of skin possibly due to continuous moisturization by ointment.  Nontender to palpation of the digit.  Patient able to flex and extend MCP, DIP, PIP.  No pain on palpation of the flexor tendon sheath.  Hand is warm well-perfused with BCR.              Recent Labs     12/07/24  0700   WBC 0.8*   HGB 7.8*   HCT 24.6*   *      K 4.1   BUN 6   CREATININE 0.4*   GLUCOSE 125*      Meds:   Abx: Unasyn and acyclovir  DVT ppx: Lovenox  See rec for complete list    Impression 24 y.o. female being seen for:    -Left middle finger paronychia and Cellulitis s/p debridement 12/4 and nail removal 12/6    Plan  - No further plastic surgery surgical intervention planned at this time.  - Band aid to left middle finger.  Please maintain and keep clean and dry.   - Dressing instructions: Daily dressing changes to left middle finger with bacitracin and band aid.  - 
           Plastic Surgery Progress Note    Patient:  Patricia Bansal  YOB: 2000     24 y.o. female    Subjective:  Patient seen and examined this morning. No complaints or concerns. No issues overnight per nursing. Pain is well controlled on current regimen. Denies fever, HA, CP, SOB, N/V, dysuria, new numbness/tingling.  Patient has been doing Betadine soaks every 4 hours.  Cultures continue to show contaminant with surface material and unable to be processed.    Vitals reviewed, afebrile    Objective:   Vitals:    12/03/24 0402   BP: 125/80   Pulse: 97   Resp: 16   Temp: 99.9 °F (37.7 °C)   SpO2:      Gen: NAD, cooperative    Cardiovascular: Regular rate   Respiratory: No acute respiratory distress, breathing comfortably    Plastic Exam  Left hand: Soft dressings overlying third digit clean/dry/intact were taken down.  Prior to site where 1 x 1 cm blister was deroofed appears to be healthy erythematous tissue.  Small amount of blistering on the radial aspect of distal phalanx, small amounts of blistering on ulnar aspect of distal phalanx with slight maceration.  Blistering does have scant amount of fluctuance.  No fluctuance able to be expressed from blistering site or trephination site of nail.  Tender to palpation at the distal aspect of the digit.  Nontender to palpation along the flexor sheath.  Unable to flex or extend DIP, able to flex and extend PIP and MCP.  Sensation intact to light touch throughout hand and middle finger.  Fingers are warm well-perfused with BCR.    Left third digit            Recent Labs     12/02/24  0412   WBC 0.6*   HGB 7.8*   HCT 24.4*   PLT See Reflexed IPF Result   INR 1.4   *   K 3.6*   BUN 6   CREATININE 0.5*   GLUCOSE 114*      Meds:   Abx: Vancomycin and cefepime  See rec for complete list    Impression 24 y.o. female being seen for:    -Left middle finger paronychia and Cellulitis    Plan  - No acute plastic surgery intervention planned at this time  - 
           Plastic Surgery Progress Note    Patient:  Patricia Bansal  YOB: 2000     24 y.o. female    Subjective:  Patient seen and examined this morning. No complaints or concerns. No issues overnight per nursing. Pain is well controlled on current regimen. Denies fever, HA, CP, SOB, N/V, dysuria, new numbness/tingling.  Patient has been doing Betadine soaks every 4 hours.  Cultures continue to show contaminant with surface material and unable to be processed. Blister on Left middle finger unroofed on 12/4.     Vitals reviewed, afebrile    Objective:   Vitals:    12/05/24 0830   BP: (!) 141/75   Pulse: 76   Resp: 18   Temp: 98.9 °F (37.2 °C)   SpO2: 98%     Gen: NAD, cooperative    Cardiovascular: Regular rate   Respiratory: No acute respiratory distress, breathing comfortably    Plastic Exam  Left hand: Soft dressings overlying third digit clean/dry/intact were taken down.  Prior to site where 1 x 1 cm blister was deroofed appears to be healthy erythematous tissue.  Small amount of blistering on the radial aspect of distal phalanx, small amounts of blistering on ulnar aspect of distal phalanx with slight maceration.  Blistering does have scant amount of fluctuance.  No fluctuance able to be expressed from blistering site or trephination site of nail.  Tender to palpation at the distal aspect of the digit.  Nontender to palpation along the flexor sheath.  Unable to flex or extend DIP, able to flex and extend PIP and MCP.  Sensation intact to light touch throughout hand and middle finger.  Fingers are warm well-perfused with BCR.    These areas were debrided this afternoon    Left third digit    Before debridement 12/4:              After debridement 12/4              Recent Labs     12/04/24  0430 12/05/24  0559   WBC 1.1* 1.2*   HGB 7.8* 7.8*   HCT 24.4* 23.8*   * See Reflexed IPF Result   INR 1.3  --    NA  --  138   K  --  3.8   BUN  --  5*   CREATININE  --  0.5*   GLUCOSE  --  116*    
           Plastic Surgery Progress Note    Patient:  Patricia Bansal  YOB: 2000     24 y.o. female    Subjective:  Patient seen and examined this morning. No complaints or concerns. No issues overnight per nursing. Pain is well controlled on current regimen. Denies fever, HA, CP, SOB, N/V, dysuria, new numbness/tingling.  Patient has been doing Betadine soaks every 4 hours.  Cultures continue to show contaminant with surface material and unable to be processed. Blister on Left middle finger unroofed. Fluid continues to drain from under nail. Verbal consent was obtained.     Vitals reviewed, afebrile    Objective:   Vitals:    12/04/24 1545   BP: 111/64   Pulse: (!) 105   Resp: 18   Temp: 100.2 °F (37.9 °C)   SpO2: 95%     Gen: NAD, cooperative    Cardiovascular: Regular rate   Respiratory: No acute respiratory distress, breathing comfortably    Plastic Exam  Left hand: Soft dressings overlying third digit clean/dry/intact were taken down.  Prior to site where 1 x 1 cm blister was deroofed appears to be healthy erythematous tissue.  Small amount of blistering on the radial aspect of distal phalanx, small amounts of blistering on ulnar aspect of distal phalanx with slight maceration.  Blistering does have scant amount of fluctuance.  No fluctuance able to be expressed from blistering site or trephination site of nail.  Tender to palpation at the distal aspect of the digit.  Nontender to palpation along the flexor sheath.  Unable to flex or extend DIP, able to flex and extend PIP and MCP.  Sensation intact to light touch throughout hand and middle finger.  Fingers are warm well-perfused with BCR.    These areas were debrided this afternoon    Left third digit    Before debridement 12/4:              After debridement 12/4              Recent Labs     12/03/24  0557 12/04/24  0430   WBC 1.3* 1.1*   HGB 7.7* 7.8*   HCT 23.9* 24.4*   PLT See Reflexed IPF Result 112*   INR 1.4 1.3     --    K 3.7  --  
          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 chronic immunosuppression post rituximab July 2024, on Plaquenil and prednisone also has a history of PE,  Discussion / summary of stay / plan of care/ Recommendations:     HENCE:   Plastic - bedside I/D finger abscess - await cx still pend  Agree w cefepime and vanco   HSV 1  pubic rash/ ulcers ( PCR +)  Keep acyclovir iv till lesions dry then drop to po course total 14 days  Will benefit after that from a acyclovir 400 mg po daily long term suppression   Rheumatology - note appreciated   Neutropenia 0.6 WBC    Infection Control Recommendations   [place] Precautions    Antimicrobial Stewardship Recommendations   Simplification of therapy  Targeted therapy      History of Present Illness:   Initial history:  Patricia Bansal is a 24 y.o.-year-old female with history of lupus, GPA vasculitis, history of 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 chronic immunosuppression post rituximab July 2024, on Plaquenil and prednisone also has a history of PE, vulvar dysplasia,  Recently had a left finger paronychia November 26, 2024 was given Bactrim and Keflex through December 3 as planned-but she was not better and her hand was getting worse with edema and pain so she came back to the ER -she has swelling of the FINGER- reduced range of motion, but has been progressing over the last 2 days she 
          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 Kyle, the blister over the finger might have started as a brody but became superinfected.  I suggest  12/4 plastic  lanced the remaining blister  HSV 1  pubic rash/ ulcers ( PCR +) initial infection  Keep acyclovir iv till lesions dry then drop to po course total 14 days  Will benefit after that from a acyclovir 400 mg po daily long term suppression   12/4 groin lesions starting to try partially, but the labial area continues to be very raw  ongoing pain of the labia  and perianal - add lidocaine cream  Rheumatology - note appreciated   Neutropenia 0.6 - 1.3 WBC      Case discussed with medicine  Infection Control Recommendations   [place] Precautions    Antimicrobial Stewardship Recommendations   Simplification of therapy  Targeted therapy      History of Present Illness:   Initial history:  Patricia Bansal is a 24 y.o.-year-old female with history of lupus, GPA vasculitis, history of 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 Kyle, the blister over the finger might have started as a brody but became superinfected.  I suggest lancing the remaining blister for a better response.  HSV 1  pubic rash/ ulcers ( PCR +)  Keep acyclovir iv till lesions dry then drop to po course total 14 days  Will benefit after that from a acyclovir 400 mg po daily long term suppression   12/4 groin lesions starting to try partially, but the labial area continues to be very raw  GYN concerned about a labial abscess, suggest CT since exam is very difficult at this point due to ongoing pain  Rheumatology - note appreciated   Neutropenia 0.6 - 1.3 WBC      Case discussed with medicine  Infection Control Recommendations   [place] Precautions    Antimicrobial Stewardship Recommendations   Simplification of therapy  Targeted therapy      History of Present Illness:   Initial history:  Patricia Bansal is a 24 y.o.-year-old female with history of lupus, GPA vasculitis, history of pansinusitis alveolar hemorrhage 
          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 Kyle, the blister over the finger might have started as a brody but became superinfected.  I suggest  12/4 plastic  lanced the remaining blister  HSV 1  pubic rash/ ulcers ( PCR +) initial infection  Keep acyclovir iv while admitted - lesions drying better -  Reconsiled for DC on acyclovir po 14 more  days at DC.  Then acyclovir 400 mg po bid long term suppression   See me  office in 30 days  12/4 groin lesions starting to try partially, but the labial area continues to be very raw  ongoing pain of the labia  and perianal - add lidocaine cream  12/10 lesions much better and drying vs open ulcers   Rheumatology - note appreciated   Neutropenia 0.6 - 1.3 WBC      Case discussed with medicine  Infection Control Recommendations   [place] Precautions    Antimicrobial Stewardship Recommendations   Simplification of therapy  Targeted therapy      History of Present Illness:   Initial history:  Patricia Bansal is a 24 y.o.-year-old female with 
          Pharmacy Note     Renal Dose Adjustment    Patricia Bansal is a 24 y.o. female. Pharmacist assessment of renally cleared medications.    Recent Labs     12/02/24  0412 12/03/24  0557   BUN 6 4*       Recent Labs     12/02/24  0412 12/03/24  0557   CREATININE 0.5* 0.5*       Estimated Creatinine Clearance: 218 mL/min (A) (based on SCr of 0.5 mg/dL (L)).  Estimated CrCl using Ideal Body Weight: 218 mL/min (based on IBW  kg)    Height:   Ht Readings from Last 1 Encounters:   11/30/24 1.626 m (5' 4\")     Weight:  Wt Readings from Last 1 Encounters:   12/04/24 113.7 kg (250 lb 10.6 oz)       The following medication dose has been adjusted based upon renal function per P&T Guidelines:             Acyclovir dose adjusted to 390 mg using adjusted body weight for BMI > 30 . ( CURRENT Bmi 43)    Thank you  Kirti Reyes, PharmD, [facility]  Inpatient Clinical Pharmacist  [phone]        
    [facility]   Gastroenterology Progress Note    Patricia Bansal is a 24 y.o. female patient.  Hospitalization Day:4      Chief consult reason:   Fever and chills    Subjective:  Pt seen and examined. No acute events overnight  Patient denies any nausea vomiting or abdominal pain  LFTs continue to improve  Patient still febrile today, on Unasyn  VITALS:  /82   Pulse 97   Temp 100.1 °F (37.8 °C) (Oral)   Resp 18   Ht 1.626 m (5' 4\")   Wt 113.7 kg (250 lb 10.6 oz)   LMP 2024 (Approximate)   SpO2 96%   BMI 43.03 kg/m²   TEMPERATURE:  Current - Temp: 100.1 °F (37.8 °C); Max - Temp  Av.5 °F (37.5 °C)  Min: 98.9 °F (37.2 °C)  Max: 100.1 °F (37.8 °C)    Physical Assessment:  General appearance:  alert, cooperative and no distress  Mental Status:  oriented to person, place and time and normal affect  Lungs:  clear to auscultation bilaterally, normal effort  Heart:  regular rate and rhythm, no murmur  Abdomen:  soft, nontender, nondistended, normal bowel sounds, no masses, hepatomegaly, splenomegaly  Extremities:  no edema, redness, tenderness in the calves  Skin:  no gross lesions, rashes, induration    Data Review:    Labs and Imaging:       CBC:  Recent Labs     242 24  0557 24  0430   WBC 0.6* 1.3* 1.1*   HGB 7.8* 7.7* 7.8*   MCV 95.7 96.0 99.6   RDW 14.4 14.7* 15.6*   PLT See Reflexed IPF Result See Reflexed IPF Result 112*       ANEMIA STUDIES:  No results for input(s): \"TIBC\", \"FERRITIN\", \"[id number]\", \"FOLATE\", \"OCCULTBLD\" in the last 72 hours.    Invalid input(s): \"LABIRON\"      BMP:  Recent Labs     24  0412 24  0557   * 136   K 3.6* 3.7    104   CO2 22 22   BUN 6 4*   CREATININE 0.5* 0.5*   GLUCOSE 114* 103*   CALCIUM 7.8* 7.8*       LFTS:  Recent Labs     24  1810 24  0412 24  0557 24  0430   ALKPHOS 117* 107* 140* 147*   ALT 1,187* 924* 588* 462*   AST 2,017* 1,279* 369* 286*   BILITOT 0.8 0.6 0.5 0.5 
    [facility]   Gastroenterology Progress Note    Patricia Bansal is a 24 y.o. female patient.  Hospitalization Day:3      Chief consult reason:   Fever and chills    Subjective:  Pt seen and examined. No acute events overnight  Slight lower abdominal pain, low grade fever this am  Significant improvement in LFT's  -140, -588, AST 1279-369, TB normal  Hgb stable from yesterday  VITALS:  /68   Pulse 99   Temp (!) 100.6 °F (38.1 °C) (Oral)   Resp 17   Ht 1.626 m (5' 4\")   Wt 114.9 kg (253 lb 4.9 oz)   LMP 2024 (Approximate)   SpO2 97%   BMI 43.48 kg/m²   TEMPERATURE:  Current - Temp: (!) 100.6 °F (38.1 °C); Max - Temp  Av.6 °F (37.6 °C)  Min: 98.2 °F (36.8 °C)  Max: 100.6 °F (38.1 °C)    Physical Assessment:  General appearance:  alert, cooperative and no distress  Mental Status:  oriented to person, place and time and normal affect  Lungs:  clear to auscultation bilaterally, normal effort  Heart:  regular rate and rhythm, no murmur  Abdomen:  soft, nontender, nondistended, normal bowel sounds, no masses, hepatomegaly, splenomegaly  Extremities:  no edema, redness, tenderness in the calves  Skin:  no gross lesions, rashes, induration    Data Review:    Labs and Imaging:       CBC:  Recent Labs     24  1615 24  0712 24  0412 24  0557   WBC 1.2* 0.7* 0.6* 1.3*   HGB 10.7* 8.5* 7.8* 7.7*   MCV 91.5 96.0 95.7 96.0   RDW 14.6* 14.2 14.4 14.7*   PLT See Reflexed IPF Result See Reflexed IPF Result See Reflexed IPF Result See Reflexed IPF Result       ANEMIA STUDIES:  Recent Labs     24  0712   TIBC 289   FERRITIN 5,632*   [id number] >2000*   FOLATE 17.6       BMP:  Recent Labs     24  0712 24  0412 24  0557   * 135* 136   K 3.9 3.6* 3.7    102 104   CO2 19* 22 22   BUN 8 6 4*   CREATININE 0.6 0.5* 0.5*   GLUCOSE 117* 114* 103*   CALCIUM 8.3* 7.8* 7.8*       LFTS:  Recent Labs     24  1907 24  0712 
   12/02/24 1949   RT Protocol   History Pulmonary Disease 2   Respiratory pattern 0   Breath sounds 0   Cough 0   Indications for Bronchodilator Therapy None   Bronchodilator Assessment Score 2       
BRONCHOSPASM/BRONCHOCONSTRICTION     [x]         IMPROVE AERATION/BREATH SOUNDS  [x]   ADMINISTER BRONCHODILATOR THERAPY AS APPROPRIATE  [x]   ASSESS BREATH SOUNDS  [x]   IMPLEMENT AEROSOL/MDI PROTOCOL  [x]   PATIENT EDUCATION AS NEEDED    
[facility]   Pharmacy Pharmacokinetic Monitoring Service - Vancomycin    Consulting Provider: Dr Latham   Indication: cellulitis   Target Concentration: Goal trough of 10-15 mg/L and AUC/CHESTER <500 mg*hr/L  Day of Therapy: 3  Additional Antimicrobials: cefepime     Pertinent Laboratory Values:   Wt Readings from Last 1 Encounters:   11/30/24 114.2 kg (251 lb 12.3 oz)     Temp Readings from Last 1 Encounters:   12/02/24 98.4 °F (36.9 °C) (Oral)     Estimated Creatinine Clearance: 219 mL/min (A) (based on SCr of 0.5 mg/dL (L)).  Recent Labs     12/01/24  0712 12/02/24  0412   CREATININE 0.6 0.5*   BUN 8 6   WBC 0.7* 0.6*     Procalcitonin:     Pertinent Cultures:  Culture Date Source Results        MRSA Nasal Swab:     Recent vancomycin administrations                     vancomycin (VANCOCIN) 1,500 mg in sodium chloride 0.9 % 250 mL IVPB ([id number]) (mg) 1,500 mg New Bag 12/01/24 2344     1,500 mg New Bag  0912      Restarted 11/30/24 2324     1,500 mg New Bag  2312                    Assessment:  Date/Time Current Dose Concentration Timing of Concentration (h) AUC   12/2 1500 mg q 12 h 12.3 4 h post dose  201   Note: Serum concentrations collected for AUC dosing may appear elevated if collected in close proximity to the dose administered, this is not necessarily an indication of toxicity    Plan:  Current dosing regimen is sub-therapeutic  Increase dose to 1500 mg q 8 hrs   Repeat vancomycin concentration not ordered   Pharmacy will continue to monitor patient and adjust therapy as indicated    Thank you for the consult,  Kirti Reyes RPH  12/2/2024 10:23 AM   
[facility]   Pharmacy Pharmacokinetic Monitoring Service - Vancomycin     Patricia Bansal is a 24 y.o. female starting on vancomycin therapy for cellulitis. Pharmacy consulted by Jenny Latham MD  for monitoring and adjustment.    Target Concentration: Goal AUC/CHESTER 400-600 mg*hr/L    Additional Antimicrobials: cefepime    Pertinent Laboratory Values:   Wt Readings from Last 1 Encounters:   11/30/24 108.9 kg (240 lb)     Temp Readings from Last 1 Encounters:   11/30/24 98.7 °F (37.1 °C) (Oral)     Estimated Creatinine Clearance: 133 mL/min (based on SCr of 0.8 mg/dL).  Recent Labs     11/30/24  1615 11/30/24  1907   CREATININE  --  0.8   BUN  --  11   WBC 1.2*  --           Pertinent Cultures:  Culture Date Source Results           MRSA Nasal Swab: N/A. Non-respiratory infection.    Plan:  Dosing recommendations based on Bayesian software  Start vancomycin 1500 mg every 12 hours  Anticipated AUC of 543 and trough concentration of 11.8 at steady state  Renal labs as indicated   Vancomycin concentration ordered for   @     Pharmacy will continue to monitor patient and adjust therapy as indicated    Thank you for the consult,  Matthieu Patterson RP  11/30/2024 9:55 PM   
[facility]  Office: [phone]  Luis Chau DO, Dave Ferrara DO, David Yañez DO, Attila Mitchell DO, Moreno Kang MD, No Lara MD, Norman Kingston MD, Cristin Cartagena MD,  Michael Lambert MD, Alan Fernandez MD, Maged Rodriguez MD,  Ralph Almaguer DO, Colby Rodrigez MD, Reese Fernandez MD, Tc Chau DO, Jenny Latham MD,  Vance Her DO, Yuli Ramirez MD, Alejandra Cooper MD, Kym Beatty MD, Mary Wilson MD,  Magnus Jones MD, Maribel Tompkins MD, Bennett Villagran MD, Bobby Roca MD, Ramiro Howe MD, Michael Mckeon MD, Jimbo Dotson DO, Jez Newman MD, Shirley Waterhouse, CNP,  Karlee Funes, CNP, Jimbo Soares, KUSHAL,  Jolynn Avila, GERSON, Kalie Rivera, CNP, Lara Thapa, CNP, Brianne Momin, CNP, Annette Spivey, CNP, MARY CarrizalesC, MARY FlemingC, Dayana Le, CNP, Nikos Nowak, CNP,  Barb Sims, CNP, Gifty Taylor, CNP,  Judith Gillette, CNP, Bonnie Azul, CNP         [facility]   IN-PATIENT SERVICE   [facility]    Progress Note    12/8/2024    8:14 AM    Name:   Patricia Bansal  MRN:     [id number]     Acct:      [id number]   Room:   2018/2018-01  IP Day:  8  Admit Date:  11/30/2024  2:43 PM    PCP:   Ángela Bernstein MD  Code Status:  Full Code    Subjective:     C/C:   Chief Complaint   Patient presents with    Hand Pain     Interval History Status: improved.     Pt had a temp Tm 100.2 overnight.  Plastic surgery took the nail off of her left middle finger a few days ago.  She still has some groin and vaginal pain but it's a little better.  She states that she took a shower today.  She did get up to the chair for 2 hours today, she's back in bed.  She still feels constipated, not drinking the Miralax much.      Brief History:     Per my partner:  \"24-year-old female with history of lupus and GPA vasculitis, chronic immunosuppression on immunosuppressants at home, vaginal dysplasia post excision on 
[facility]  Office: [phone]  Luis Chau DO, Dave Ferrara DO, David Yañez DO, Attila Mitchell DO, Moreno Kang MD, No Lara MD, Norman Kingston MD, Cristin Cartagena MD,  Michael Lambert MD, Alan Fernandez MD, Maged Rodriguez MD,  Ralph Almaguer DO, Colby Rodrigez MD, Reese Fernandez MD, Tc Chau DO, Jenny Latham MD,  Vance Her DO, Yuli Ramirez MD, Alejandra Cooper MD, Kym Beatty MD, Mary Wilson MD,  Magnus Jones MD, Maribel Tompkins MD, Bennett Villagran MD, Bobby Roca MD, Ramiro Howe MD, Michael Mckeon MD, Jimbo Dotson DO, Jez Newman MD, Shirley Waterhouse, CNP,  Karlee Funes CNP, Jimbo Soares, KUSHAL,  Jolynn Avila, GERSON, Kalie Rivera, CNP, Lara Thapa, CNP, Brianne Momin, CNP, Annette Spivey, CNP, MARY CarrizalesC, MARY FlemingC, Dayana Le, CNP, Nikos Nowak, CNP,  Barb Sims, CNP, Gifty Taylor, CNP,  Judith Gillette, CNP, Bonnie Azul, CNP         [facility]   IN-PATIENT SERVICE   [facility]    Progress Note    12/6/2024    11:21 AM    Name:   Patricia Bansal  MRN:     [id number]     Acct:      [id number]   Room:   2018/2018-01  IP Day:  6  Admit Date:  11/30/2024  2:43 PM    PCP:   Ángela Bernstein MD  Code Status:  Full Code    Subjective:     C/C:   Chief Complaint   Patient presents with    Hand Pain     Interval History Status: improved.     Pt had a temp Tm 103.1 yesterday.  Plastic surgery took the nail off of her left middle finger today.  She still has some groin and vaginal pain.  She hasn't gotten up much, she plans to take a shower today.    Brief History:     Per my partner:  \"24-year-old female with history of lupus and GPA vasculitis, chronic immunosuppression on immunosuppressants at home, vaginal dysplasia post excision on 5-fluorouracil at home, STD came in with worsening left finger infection/abscess, fever, chills, right upper quadrant pain, genital pain/ulcers/discharge, 
Nutrition Assessment     Type and Reason for Visit: Initial, LOS    Nutrition Recommendations/Plan:   Continue current diet.     Malnutrition Assessment:  Malnutrition Status: No malnutrition    Nutrition Assessment:  Pt admit fore cellulitis. PMH: Lupus. Seen for LOS. Pt has varied intakes since admission but states her appetite is getting back to normal now.    Nutrition Related Findings:   No edema. LBM 12/4 - constipation. Meds: colace. Labs reviewed. Wound Type:  (left finger wound)    Current Nutrition Therapies:    ADULT DIET; Regular    Anthropometric Measures:  Height: 162.6 cm (5' 4.02\")  Current Body Wt: 115.1 kg (253 lb 12 oz)   BMI: 43.5        Nutrition Diagnosis:   No nutrition diagnosis at this time       Nutrition Interventions:   Food and/or Nutrient Delivery: Continue Current Diet    Discharge Planning:    No discharge needs at this time     KIKA RO  Contact: [id number]    
Obstetric/Gynecology Resident Interval Note    Primary team requested OB/GYN present to patient bedside to evaluate perineum as patient states that pain has been worsening.    Presented to patient bedside and vesicles around perineal and vulvar region appear to be deroofed with purulent appearing drainage occurring.  Labial lesion does not appear to have enlarged.    Given patient's comorbidities, at this time, labial I&D is not recommended.  Of note, patient has a history of MARY 3 and follows with gyn-oncology at [facility].    If patient is to be discharged before appointment, exam under anesthesia and vulvar biopsy is planned with [facility].    Continue plan per primary team and continue IV acyclovir and antiviral per infectious disease.    OB/GYN team will continue to be signed off but please do not hesitate to reach out for any further questions.    Jennifer Avalos MD  OB/GYN Resident, PGY3  [place], [place]  12/3/2024, 7:47 PM      
Patient left with all her belongings and paperwork 12/10/24 at 1635. Boyfriend came to pick patient up. Patient verbalized understanding of discharge paperwork and follow up scheduling. Patient to pick prescriptions up at [facility] on [address].  
Rheumatology  Attending Progress Note      SUBJECTIVE:  F/U SLE/vasculitis    OBJECTIVE Currently stable but had febrile episode overnight. Complains of pain left middle finger and groin. Had nail removal done by plastic surgery this morning. HSV PCR for type 1 positive from Left finger vesicle.  Hemodynamically stable, afebrile.  No new rashes or arthralgia.  No difficulty breathing or abdominal pain. Patient is not very mobile.    Medications    Current Facility-Administered Medications: neomycin-bacitracin-polymyxin (NEOSPORIN) ointment, , Topical, BID  ampicillin-sulbactam (UNASYN) 3,000 mg in sodium chloride 0.9 % 100 mL IVPB (mini-bag), 3,000 mg, IntraVENous, Q6H  acyclovir (ZOVIRAX) 390 mg in sodium chloride 0.9 % 250 mL IVPB, 5 mg/kg (Adjusted), IntraVENous, Q8H  predniSONE (DELTASONE) tablet 20 mg, 20 mg, Oral, BID  lidocaine 4 % external patch 1 patch, 1 patch, TransDERmal, Daily  oxyCODONE (ROXICODONE) immediate release tablet 5 mg, 5 mg, Oral, Q6H PRN  magic (miracle) mouthwash, 15 mL, Swish & Swallow, TID  docusate sodium (COLACE) capsule 200 mg, 200 mg, Oral, BID  polyethylene glycol (GLYCOLAX) packet 17 g, 17 g, Oral, BID  albuterol sulfate HFA (PROVENTIL;VENTOLIN;PROAIR) 108 (90 Base) MCG/ACT inhaler 2 puff, 2 puff, Inhalation, Q6H PRN  ibuprofen (ADVIL;MOTRIN) tablet 600 mg, 600 mg, Oral, Q6H PRN  famotidine (PEPCID) tablet 20 mg, 20 mg, Oral, BID  miconazole (MICOTIN) 2 % cream, , Topical, BID  albuterol (PROVENTIL) (2.5 MG/3ML) 0.083% nebulizer solution 2.5 mg, 2.5 mg, Nebulization, Q4H PRN  [Held by provider] hydroxychloroquine (PLAQUENIL) tablet 200 mg, 200 mg, Oral, Daily  lidocaine (XYLOCAINE) 4 % external solution, , Topical, PRN  clotrimazole (LOTRIMIN) 1 % vaginal cream, , Vaginal, BID  sodium chloride flush 0.9 % injection 5-40 mL, 5-40 mL, IntraVENous, 2 times per day  sodium chloride flush 0.9 % injection 10 mL, 10 mL, IntraVENous, PRN  0.9 % sodium chloride infusion, , IntraVENous, 
Rheumatology  Attending Progress Note      SUBJECTIVE:  F/U SLE/vasculitis    OBJECTIVE Main complaint today is perineal pain from rah, finger feeling better, no new arthralgia, rash    Medications    Current Facility-Administered Medications: povidone-iodine (BETADINE) 10 % 30 mL in sod chloride IRR soln 0.9 % 1,000 mL irrigation, , Topical, Q4H  ipratropium 0.5 mg-albuterol 2.5 mg (DUONEB) nebulizer solution 1 Dose, 1 Dose, Inhalation, Q4H WA RT  albuterol sulfate HFA (PROVENTIL;VENTOLIN;PROAIR) 108 (90 Base) MCG/ACT inhaler 2 puff, 2 puff, Inhalation, Q6H PRN  vancomycin (VANCOCIN) 1,500 mg in sodium chloride 0.9 % 250 mL IVPB ([id number]), 1,500 mg, IntraVENous, Q8H  Magic Mouthwash (Miracle Mouthwash) 15 mL, 15 mL, Swish & Swallow, TID  ibuprofen (ADVIL;MOTRIN) tablet 600 mg, 600 mg, Oral, Q6H PRN  famotidine (PEPCID) tablet 20 mg, 20 mg, Oral, BID  oxyCODONE-acetaminophen (PERCOCET) 5-325 MG per tablet 1 tablet, 1 tablet, Oral, Q4H PRN **OR** oxyCODONE-acetaminophen (PERCOCET) 5-325 MG per tablet 2 tablet, 2 tablet, Oral, Q4H PRN  acyclovir (ZOVIRAX) 550 mg in sodium chloride 0.9 % 250 mL IVPB, 5 mg/kg, IntraVENous, Q8H  predniSONE (DELTASONE) tablet 10 mg, 10 mg, Oral, BID  miconazole (MICOTIN) 2 % cream, , Topical, BID  albuterol (PROVENTIL) (2.5 MG/3ML) 0.083% nebulizer solution 2.5 mg, 2.5 mg, Nebulization, Q4H PRN  [Held by provider] hydroxychloroquine (PLAQUENIL) tablet 200 mg, 200 mg, Oral, Daily  lidocaine (XYLOCAINE) 4 % external solution, , Topical, PRN  0.9 % sodium chloride infusion, , IntraVENous, Continuous  ceFEPIme (MAXIPIME) 2,000 mg in sodium chloride 0.9 % 100 mL IVPB (mini-bag), 2,000 mg, IntraVENous, Q12H  clotrimazole (LOTRIMIN) 1 % vaginal cream, , Vaginal, BID  sodium chloride flush 0.9 % injection 5-40 mL, 5-40 mL, IntraVENous, 2 times per day  sodium chloride flush 0.9 % injection 10 mL, 10 mL, IntraVENous, PRN  0.9 % sodium chloride infusion, , IntraVENous, PRN  potassium chloride 
Rheumatology  Attending Progress Note      SUBJECTIVE:  F/U SLE/vasculitis    OBJECTIVE Some stomach pain this AM, had supper, vaginal area feels about the same, finger feeling better, no new arthralgia, rash    Medications    Current Facility-Administered Medications: povidone-iodine (BETADINE) 10 % 30 mL in sod chloride IRR soln 0.9 % 1,000 mL irrigation, , Topical, Q4H  albuterol sulfate HFA (PROVENTIL;VENTOLIN;PROAIR) 108 (90 Base) MCG/ACT inhaler 2 puff, 2 puff, Inhalation, Q6H PRN  vancomycin (VANCOCIN) 1,500 mg in sodium chloride 0.9 % 250 mL IVPB ([id number]), 1,500 mg, IntraVENous, Q8H  Magic Mouthwash (Miracle Mouthwash) 15 mL, 15 mL, Swish & Swallow, TID  ibuprofen (ADVIL;MOTRIN) tablet 600 mg, 600 mg, Oral, Q6H PRN  famotidine (PEPCID) tablet 20 mg, 20 mg, Oral, BID  oxyCODONE-acetaminophen (PERCOCET) 5-325 MG per tablet 1 tablet, 1 tablet, Oral, Q4H PRN **OR** oxyCODONE-acetaminophen (PERCOCET) 5-325 MG per tablet 2 tablet, 2 tablet, Oral, Q4H PRN  acyclovir (ZOVIRAX) 550 mg in sodium chloride 0.9 % 250 mL IVPB, 5 mg/kg, IntraVENous, Q8H  predniSONE (DELTASONE) tablet 10 mg, 10 mg, Oral, BID  miconazole (MICOTIN) 2 % cream, , Topical, BID  albuterol (PROVENTIL) (2.5 MG/3ML) 0.083% nebulizer solution 2.5 mg, 2.5 mg, Nebulization, Q4H PRN  [Held by provider] hydroxychloroquine (PLAQUENIL) tablet 200 mg, 200 mg, Oral, Daily  lidocaine (XYLOCAINE) 4 % external solution, , Topical, PRN  0.9 % sodium chloride infusion, , IntraVENous, Continuous  ceFEPIme (MAXIPIME) 2,000 mg in sodium chloride 0.9 % 100 mL IVPB (mini-bag), 2,000 mg, IntraVENous, Q12H  clotrimazole (LOTRIMIN) 1 % vaginal cream, , Vaginal, BID  sodium chloride flush 0.9 % injection 5-40 mL, 5-40 mL, IntraVENous, 2 times per day  sodium chloride flush 0.9 % injection 10 mL, 10 mL, IntraVENous, PRN  0.9 % sodium chloride infusion, , IntraVENous, PRN  potassium chloride (KLOR-CON M) extended release tablet 40 mEq, 40 mEq, Oral, PRN **OR** potassium 
Rheumatology  Attending Progress Note      SUBJECTIVE:  F/U SLE/vasculitis    OBJECTIVE no acute events overnight.  Overall feels improved from admission but still complains of pain left middle finger.  Hemodynamically stable, afebrile.  No new rashes or arthralgia.  No difficulty breathing or abdominal pain.  Complains of discomfort at the genital area due to lesions.    Medications    Current Facility-Administered Medications: lidocaine 4 % external patch 1 patch, 1 patch, TransDERmal, Daily  oxyCODONE (ROXICODONE) immediate release tablet 5 mg, 5 mg, Oral, Q6H PRN  magic (miracle) mouthwash, 15 mL, Swish & Swallow, TID  docusate sodium (COLACE) capsule 200 mg, 200 mg, Oral, BID  polyethylene glycol (GLYCOLAX) packet 17 g, 17 g, Oral, BID  povidone-iodine (BETADINE) 10 % 30 mL in sod chloride IRR soln 0.9 % 1,000 mL irrigation, , Topical, Q4H  albuterol sulfate HFA (PROVENTIL;VENTOLIN;PROAIR) 108 (90 Base) MCG/ACT inhaler 2 puff, 2 puff, Inhalation, Q6H PRN  vancomycin (VANCOCIN) 1,500 mg in sodium chloride 0.9 % 250 mL IVPB ([id number]), 1,500 mg, IntraVENous, Q8H  ibuprofen (ADVIL;MOTRIN) tablet 600 mg, 600 mg, Oral, Q6H PRN  famotidine (PEPCID) tablet 20 mg, 20 mg, Oral, BID  acyclovir (ZOVIRAX) 550 mg in sodium chloride 0.9 % 250 mL IVPB, 5 mg/kg, IntraVENous, Q8H  predniSONE (DELTASONE) tablet 10 mg, 10 mg, Oral, BID  miconazole (MICOTIN) 2 % cream, , Topical, BID  albuterol (PROVENTIL) (2.5 MG/3ML) 0.083% nebulizer solution 2.5 mg, 2.5 mg, Nebulization, Q4H PRN  [Held by provider] hydroxychloroquine (PLAQUENIL) tablet 200 mg, 200 mg, Oral, Daily  lidocaine (XYLOCAINE) 4 % external solution, , Topical, PRN  ceFEPIme (MAXIPIME) 2,000 mg in sodium chloride 0.9 % 100 mL IVPB (mini-bag), 2,000 mg, IntraVENous, Q12H  clotrimazole (LOTRIMIN) 1 % vaginal cream, , Vaginal, BID  sodium chloride flush 0.9 % injection 5-40 mL, 5-40 mL, IntraVENous, 2 times per day  sodium chloride flush 0.9 % injection 10 mL, 10 mL, 
[facility]  Office: [phone]  Luis Chau DO, Dave Ferrara DO, David Yañez DO, Attila Micthell DO, Moreno Kang MD, No Lara MD, Norman Kingston MD, Cristin Cartagena MD,  Michael Lambert MD, Alan Fernandez MD, Maged Rodriguez MD,  Ralph Almaguer DO, Colby Rodrigez MD, Reese Fernandez MD, Tc Chau DO, Jenny Latham MD,  Vance Her DO, Yuli Ramirez MD, Alejandra Cooper MD, Kym Beatty MD, Mary Wilson MD,  Magnus Jones MD, Maribel Tompkins MD, Bennett Villagran MD, Bobby Roca MD, Ramiro Howe MD, Michael Mckeon MD, Jimbo Dotson DO, Jez Newman MD, Shirley Waterhouse, CNP,  Karlee Funes, CNP, Jimbo Soares, CNP,  Jolynn Avila, GERSON, Kalie Rivera, CNP, Lara Thapa, CNP, Brianne Momin, CNP, Annette Spivey, CNP, Gretchen Cruz PACoriC, MARY FlemingC, Dayana Le, CNP, Nikos Nowak, CNP,  Barb Sims, CNP, Gifty Taylor, CNP,  Judith Gillette, CNP, Bonnie Azul, CNP         [facility]   IN-PATIENT SERVICE   [facility]    Progress Note    12/4/2024    8:26 AM    Name:   Patricia Bansal  MRN:     [id number]     Acct:      [id number]   Room:   2018/2018-01  IP Day:  4  Admit Date:  11/30/2024  2:43 PM    PCP:   Ángela Bernstein MD  Code Status:  Full Code    Subjective:     C/C:   Chief Complaint   Patient presents with    Hand Pain     Interval History Status: improved.     Pt denies any fevers, Tm 100.1 this morning.  She still has some groin and vaginal pain.  + constipation despite colace, she's still drinking her Miralax.  She hasn't been up walking  much at all.  Her boyfriend is in the room.    Brief History:     Per my partner:  \"24-year-old female with history of lupus and GPA vasculitis, chronic immunosuppression on immunosuppressants at home, vaginal dysplasia post excision on 5-fluorouracil at home, STD came in with worsening left finger infection/abscess, fever, chills, right upper quadrant pain, genital 
[facility]  Office: [phone]  Luis Chau DO, Dave Ferrara DO, David Yañez DO, Attila Mitchell DO, Moreno Kang MD, No Lara MD, Norman Kingston MD, Cristin Cartagena MD,  Michael Lambert MD, Alan Fernandez MD, Maged Rodriguez MD,  Ralph Almaguer DO, Colby Rodrigez MD, Reese Fernandez MD, Tc Chau DO, Jenny Latham MD,  Vance Her DO, Yuli Ramirez MD, Alejandra Cooper MD, Kym Beatty MD, Mary Wilson MD,  Magnus Jones MD, Maribel Tompkins MD, Bennett Villagran MD, Bobby Roca MD, Ramiro Howe MD, Michael Mckeon MD, Jimbo Dotson DO, Jez Newman MD, Shirley Waterhouse, CNP,  Karlee Funes CNP, Jimbo Soares, CNP,  Jolynn Avila, GERSON, Kalie Rivera, CNP, Lara Thapa, CNP, Brianne Momin, CNP, Annette Spivey, CNP, MARY CarrizalesC, MARY FlemingC, Dayana Le, CNP, Nikos Nowak, CNP,  Barb Sims, CNP, Gifty Taylor, CNP,  Judith Gillette, CNP, Bonnie Azul, CNP         [facility]   IN-PATIENT SERVICE   [facility]    Progress Note    12/7/2024    8:34 AM    Name:   Patricia Bansal  MRN:     [id number]     Acct:      [id number]   Room:   2018/2018-01  IP Day:  7  Admit Date:  11/30/2024  2:43 PM    PCP:   Ángela Bernstein MD  Code Status:  Full Code    Subjective:     C/C:   Chief Complaint   Patient presents with    Hand Pain     Interval History Status: improved.     Pt had a temp Tm 100.8 overnight.  Plastic surgery took the nail off of her left middle finger yesterday.  She still has some groin and vaginal pain but it's a little better.  She states that she took a shower yesterday.  She did get up to the chair for 2 hours today, she's back in bed.  She did have a BM.      Brief History:     Per my partner:  \"24-year-old female with history of lupus and GPA vasculitis, chronic immunosuppression on immunosuppressants at home, vaginal dysplasia post excision on 5-fluorouracil at home, STD came in with worsening 
[facility]  Office: [phone]  Luis Chau DO, Dave Ferrara DO, David Yañez DO, Attila Mitchell DO, Moreno Kang MD, No Lara MD, Norman Kingston MD, Cristin Cartagena MD,  Michael Lambert MD, Alan Fernandez MD, Maged Rodriguez MD,  Ralph Almaguer DO, Colby Rodrigez MD, Reese Fernandez MD, Tc Chau DO, Jenny Latham MD,  Vance Her DO, Yuli Ramirez MD, Alejandra Cooper MD, Kym Beatty MD, Mary Wilson MD,  Magnus Jones MD, Maribel Tompkins MD, Bennett Villagran MD, Bobby Roca MD, Ramiro Howe MD, Michael Mckeon MD, Jimbo Dotson DO, Jez Newman MD, Shirley Waterhouse, CNP,  Karlee Funes, CNP, Jimbo Soares, KUSHAL,  Jolynn Avila, GERSON, Kalie Rivera, CNP, Lara Thapa, CNP, Brianne Momin, CNP, Annette Spivey, CNP, MARY CarrizalesC, MARY FlemingC, Dayana Le, CNP, Nikos Nowak, CNP,  Barb Sims, CNP, Gifty Taylor, CNP,  Judith Gillette, CNP, Bonnie Azul, CNP         [facility]   IN-PATIENT SERVICE   [facility]    Progress Note    12/5/2024    8:13 AM    Name:   Patricia Bansal  MRN:     [id number]     Acct:      [id number]   Room:   2018/2018-01  IP Day:  5  Admit Date:  11/30/2024  2:43 PM    PCP:   Ángela Bernstein MD  Code Status:  Full Code    Subjective:     C/C:   Chief Complaint   Patient presents with    Hand Pain     Interval History Status: improved.     Pt denies any fevers.  Plastic surgery took the blister off of her middle finger.  Looks like Herpatic Kayla.  She still has some groin and vaginal pain.  She did have a small BM and feels less constipated    Brief History:     Per my partner:  \"24-year-old female with history of lupus and GPA vasculitis, chronic immunosuppression on immunosuppressants at home, vaginal dysplasia post excision on 5-fluorouracil at home, STD came in with worsening left finger infection/abscess, fever, chills, right upper quadrant pain, genital pain/ulcers/discharge, 
  Intervention Active listening;Prayer (assurance of)/[place];Explored/Affirmed feelings, thoughts, concerns;Sustaining Presence/Ministry of presence   Outcome Engaged in conversation;Expressed feelings, needs, and concerns;Receptive       
BMI 43.53 kg/m²   General appearance: alert and cooperative with exam  HEENT: Head: Normocephalic, no lesions, without obvious abnormality.  Neck: no adenopathy, no carotid bruit, no JVD, supple, symmetrical, trachea midline, and thyroid not enlarged, symmetric, no tenderness/mass/nodules  Lungs: clear to auscultation bilaterally  Heart: regular rate and rhythm, S1, S2 normal, no murmur, click, rub or gallop  Abdomen: soft, non-tender; bowel sounds normal; no masses,  no organomegaly  Extremities: extremities normal, atraumatic, no cyanosis or edema  Neurologic: Mental status: Alert, oriented, thought content appropriate  Musculoskeletal:  normal range of motion, no joint swelling, deformity or tenderness  Assessment and Plan:   Patient with medical history of lupus was admitted for the workup of febrile illness.  She was found to have left middle finger paronychia, pancytopenia with leukopenia and elevated liver enzymes.     she was diagnosed with HSV 1.  She is being treated by infectious disease  3.  Her Plaquenil has been discontinued due to the pancytopenia.  She is currently taking prednisone 20 mg twice daily but does not seem to be responding.  4.  I reviewed her labs.  Her TOMI is negative.  The double-stranded DNA antibodies are normal.  The complements are actually higher than usual normal.  Based on these labs I doubt lupus being the culprit for her pancytopenia but we will continue prednisone 20 mg twice daily for now.  She does take rituximab every 6 months.  Her next infusion is due in January.    Patient Active Problem List:     Cellulitis     Folliculitis of perineum     Abnormal LFTs     Tachycardia     Skin ulcer of perineum, limited to breakdown of skin (HCC)     Abscess around nail of left index finger     Immunosuppressed due to chemotherapy (HCC)     SLE (systemic lupus erythematosus related syndrome) (HCC)     CRP elevated     Pancytopenia (HCC)     Positive D dimer     Herpes simplex 
and Cellulitis s/p debridement 12/4     Plan  -No acute plastic surgery intervention planned at this time.  Will discuss case with Dr. Piña to see if remaining nail needs to be removed.  - Soft dressings on left third digit.  Okay to reinforce/maintain by nursing if necessary. Please notify Ortho if saturated.  - WBAT to the left hand  - Cultures of left middle finger show Enterococcus faecalis, Streptococcus agalactiae, diptheroids  - Antibiotics per ID recommendations: Acyclovir and Unasyn  - Betadine soaks every 4 hours with soft wet-to-dry dressings to be placed after each soak  - Dressing instructions: Wet-to-dry dressings to the left middle finger. Please keep clean and dry. Please replace after each soak. Please ensure patient has supplies to go home with.   - Pain control and medical management per primary: Internal medicine  - DVT ppx: Lovenox.  Okay for chemical anticoagulation from plastic surgery standpoint  - Ice/Elevate to control pain and edema  - Diet: Adult diet   - F/u with Dr. Piña in clinic on 12/17/24   - Please page Ortho resident on call with any questions      Gregory Mcdaniel,   Orthopedic Surgery, PGY-1  [place], [place]               
infection/abscess, fever, chills, right upper quadrant pain, genital pain/ulcers/discharge, found to be febrile, tachycardic in the ER, seen by gynecology in the ER  , started on IV antibiotics , plastic surgery/ID/rheumatology consulted\"    Patient started on IV Vancomycin and IV Cefepime.  Await cultures from finger abscess.  On IV Acyclovir for HSV 1 infection.  GI evaluating patient for elevated LFTs.  Hem/Oncology consulted for pancytopenia.  Rheumatology following for Lupus and Vasculitis.      Cultures middle finger:  + Enterococcocus  +Strep agalactiae  + Prevotella  +Finegoldia    + HSV-1 PCR       Review of Systems:     Constitutional:  negative for chills, fevers, sweats  Respiratory:  negative for cough, dyspnea on exertion, shortness of breath, wheezing  Cardiovascular:  negative for chest pain, chest pressure/discomfort, lower extremity edema, palpitations  Gastrointestinal:  negative for abdominal pain, diarrhea, nausea, vomiting, + constipation  Neurological:  negative for dizziness, headache    Medications:     Allergies:    Allergies   Allergen Reactions    Bactrim [Sulfamethoxazole-Trimethoprim]        Current Meds:   Scheduled Meds:    sennosides-docusate sodium  2 tablet Oral Daily    neomycin-bacitracin-polymyxin   Topical BID    lidocaine   Topical BID    ampicillin-sulbactam  3,000 mg IntraVENous Q6H    acyclovir  5 mg/kg (Adjusted) IntraVENous Q8H    predniSONE  20 mg Oral BID    lidocaine  1 patch TransDERmal Daily    magic (miracle) mouthwash  15 mL Swish & Swallow TID    polyethylene glycol  17 g Oral BID    famotidine  20 mg Oral BID    miconazole   Topical BID    [Held by provider] hydroxychloroquine  200 mg Oral Daily    clotrimazole   Vaginal BID    sodium chloride flush  5-40 mL IntraVENous 2 times per day    enoxaparin  30 mg SubCUTAneous BID     Continuous Infusions:    sodium chloride 200 mL/hr at 12/04/24 1040     PRN Meds: oxyCODONE, albuterol sulfate HFA, ibuprofen, albuterol, 
    11/30/24  1615 11/30/24  1907 11/30/24  2117 12/01/24  0209 12/01/24  0712 12/02/24  0412   NA  --  131*  --   --  135* 135*   K  --  3.9  --   --  3.9 3.6*   CL  --  98  --   --  103 102   CO2  --  23  --   --  19* 22   GLUCOSE  --  102*  --   --  117* 114*   BUN  --  11  --   --  8 6   CREATININE  --  0.8  --   --  0.6 0.5*   ANIONGAP  --  10  --   --  13 11   LABGLOM  --  >90  --   --  >90 >90   CALCIUM  --  8.3*  --   --  8.3* 7.8*   CKTOTAL  --   --  99  --   --   --    MYOGLOBIN  --   --  27  --   --   --    LACTACIDWB 1.7  --   --  1.0 0.8  --      Recent Labs     12/01/24  0712 12/01/24  1810 12/02/24  0412   TSH 0.54  --   --    AST 1,480* 2,017* 1,279*   * 1,187* 924*   LDH 1,597*  --   --    GGT 63*  --   --    ALKPHOS 102 117* 107*   BILITOT 1.0 0.8 0.6   BILIDIR 0.6* 0.5* 0.4*     ABG:No results found for: \"POCPH\", \"PHART\", \"PH\", \"POCPCO2\", \"ZFA2JLQ\", \"PCO2\", \"POCPO2\", \"PO2ART\", \"PO2\", \"POCHCO3\", \"QNF5ASZ\", \"HCO3\", \"NBEA\", \"PBEA\", \"BEART\", \"BE\", \"THGBART\", \"THB\", \"VNX2NAE\", \"UIDN3FCL\", \"A6XZLCIH\", \"O2SAT\", \"FIO2\"  Lab Results   Component Value Date/Time    SPECIAL Site: Finger, Middle 12/01/2024 09:58 AM     Lab Results   Component Value Date/Time    CULTURE CULTURE IN PROGRESS 12/01/2024 09:58 AM       Radiology:  CT CHEST PULMONARY EMBOLISM W CONTRAST    Result Date: 12/1/2024  1. No evidence of pulmonary embolism or acute pulmonary abnormality. 2. Traction bronchiectasis within the medial segment right middle lobe, anterior segment right upper lobe, and inferior lingular segment left upper lobe.  Recommend pulmonology consultation.     XR HAND LEFT (MIN 3 VIEWS)    Result Date: 12/1/2024  No acute osseous abnormality.     US GALLBLADDER RUQ    Result Date: 12/1/2024  Unremarkable right upper quadrant ultrasound.     XR CHEST PORTABLE    Result Date: 11/30/2024  1. Mild right basilar linear opacities, possibly representing atelectasis, pneumonia, or prominent vasculature. 2. Otherwise no 
(BETADINE) 10 % 30 mL in sod chloride IRR soln 0.9 % 1,000 mL irrigation   Topical Q4H Rod Kaur DO   Given at 12/03/24 1727    albuterol sulfate HFA (PROVENTIL;VENTOLIN;PROAIR) 108 (90 Base) MCG/ACT inhaler 2 puff  2 puff Inhalation Q6H PRN Bennett Villagran MD        vancomycin (VANCOCIN) 1,500 mg in sodium chloride 0.9 % 250 mL IVPB ([id number])  1,500 mg IntraVENous Q8H Danielle Wills .7 mL/hr at 12/03/24 1901 1,500 mg at 12/03/24 1901    ibuprofen (ADVIL;MOTRIN) tablet 600 mg  600 mg Oral Q6H PRN Annette Wilson APRN - CNP   600 mg at 12/01/24 0303    famotidine (PEPCID) tablet 20 mg  20 mg Oral BID Jenny Latham MD   20 mg at 12/03/24 0823    acyclovir (ZOVIRAX) 550 mg in sodium chloride 0.9 % 250 mL IVPB  5 mg/kg IntraVENous Q8H Bennett Villagran  mL/hr at 12/03/24 1857 550 mg at 12/03/24 1857    predniSONE (DELTASONE) tablet 10 mg  10 mg Oral BID Goldberger, Edward, MD   10 mg at 12/03/24 0823    miconazole (MICOTIN) 2 % cream   Topical BID Domo Saez MD   Given at 12/03/24 0823    albuterol (PROVENTIL) (2.5 MG/3ML) 0.083% nebulizer solution 2.5 mg  2.5 mg Nebulization Q4H PRN Jenny Latham MD        [Held by provider] hydroxychloroquine (PLAQUENIL) tablet 200 mg  200 mg Oral Daily Jenny Latham MD        lidocaine (XYLOCAINE) 4 % external solution   Topical PRN Jenny Latham MD   Given at 12/02/24 1539    ceFEPIme (MAXIPIME) 2,000 mg in sodium chloride 0.9 % 100 mL IVPB (mini-bag)  2,000 mg IntraVENous Q12H Jenny Latham MD   Stopped at 12/03/24 1712    clotrimazole (LOTRIMIN) 1 % vaginal cream   Vaginal BID Jenny Latham MD   Given at 12/03/24 0824    sodium chloride flush 0.9 % injection 5-40 mL  5-40 mL IntraVENous 2 times per day Jenny Latham MD   10 mL at 12/03/24 0824    sodium chloride flush 0.9 % injection 10 mL  10 mL IntraVENous PRN Jenny Latham MD        0.9 % sodium chloride infusion   
12/09/24  1344   WBC 1.2*   RBC 2.82*   HGB 8.9*   HCT 27.5*   MCV 97.5   MCH 31.6   MCHC 32.4   RDW 15.1*      MPV 12.5     Chemistry:  Recent Labs     12/09/24  1344      K 4.3      CO2 24   GLUCOSE 101*   BUN 7   CREATININE 0.5*   ANIONGAP 12   LABGLOM >90   CALCIUM 8.9     Recent Labs     12/09/24  1344   AST 89*   *   ALKPHOS 174*   BILITOT 0.5     ABG:No results found for: \"POCPH\", \"PHART\", \"PH\", \"POCPCO2\", \"POV4EBA\", \"PCO2\", \"POCPO2\", \"PO2ART\", \"PO2\", \"POCHCO3\", \"RGY2SNZ\", \"HCO3\", \"NBEA\", \"PBEA\", \"BEART\", \"BE\", \"THGBART\", \"THB\", \"JLX0BLK\", \"WABZ5IEC\", \"X0OZHOPF\", \"O2SAT\", \"FIO2\"  Lab Results   Component Value Date/Time    SPECIAL Site: [place], Middle 12/01/2024 09:58 AM     Lab Results   Component Value Date/Time    CULTURE (A) 12/01/2024 09:58 AM     FINEGOLDIA MAGNA MODERATE GROWTH Identification by MALDI-TOF    CULTURE Work up all organisms per physician request. 12/01/2024 09:58 AM    CULTURE (A) 12/01/2024 09:58 AM     ENTEROCOCCUS FAECALIS LIGHT GROWTH Identification by MALDI-TOF    CULTURE (A) 12/01/2024 09:58 AM     STREPTOCOCCUS AGALACTIAE (GROUP B) RARE GROWTH Identification by MALDI-TOF    CULTURE DIPHTHEROIDS LIGHT GROWTH (A) 12/01/2024 09:58 AM    CULTURE (A) 12/01/2024 09:58 AM     PREVOTELLA (BACTEROIDES BIVIUS) BIVIA MODERATE GROWTH Identification by MALDI-TOF BETA LACTAMASE POSITIVE       Radiology:  No results found.    Physical Examination:        General: Alert and oriented, no acute distress  Neurologic: Awake, alert, and oriented X3, no focal weakness  Lungs: Clear to auscultation, no wheezing, non-labored respiration  Heart: Normal rate, regular rhythm, no murmur, gallop or edema  Abdomen: Soft, non-tender, non-distended, normal bowel sounds  Musculoskeletal: L 3rd finger with ulceration and maceration of skin, no drainage    Assessment:        Hospital Problems             Last Modified POA    * (Principal) Cellulitis 11/30/2024 Yes    Folliculitis of perineum 
polyphagia and polyuria.   Genitourinary:  Positive for genital sores. Negative for dysuria.   Musculoskeletal:  Negative for arthralgias.   Skin:  Positive for wound (genital).   Allergic/Immunologic: Positive for immunocompromised state.   Neurological:  Negative for dizziness.   Hematological:  Negative for adenopathy.   Psychiatric/Behavioral:  Negative for agitation.        Physical Examination :       Physical Exam  Constitutional:       General: She is not in acute distress.     Appearance: Normal appearance. She is not ill-appearing.   HENT:      Head: Normocephalic and atraumatic.      Nose: Nose normal.      Mouth/Throat:      Mouth: Mucous membranes are moist.   Eyes:      General: No scleral icterus.     Conjunctiva/sclera: Conjunctivae normal.   Cardiovascular:      Rate and Rhythm: Normal rate and regular rhythm.      Heart sounds: No murmur heard.     No gallop.   Pulmonary:      Effort: No respiratory distress.      Breath sounds: No wheezing.   Abdominal:      General: There is no distension.      Palpations: There is no mass.      Tenderness: There is no abdominal tenderness.      Hernia: No hernia is present.   Musculoskeletal:         General: Tenderness (groin area) present. No swelling, deformity or signs of injury.      Cervical back: Neck supple. No rigidity.   Skin:     Coloration: Skin is not jaundiced.      Findings: Erythema (finger tip) present. No bruising.      Comments: Many genital and pubic ulcers   Neurological:      Mental Status: She is alert and oriented to person, place, and time.      Cranial Nerves: No cranial nerve deficit.   Psychiatric:         Mood and Affect: Mood normal.         Thought Content: Thought content normal.         Past Medical History:     Past Medical History:   Diagnosis Date    Lupus        Past Surgical  History:   History reviewed. No pertinent surgical history.    Medications:      lidocaine  1 patch TransDERmal Daily    magic (miracle) mouthwash  15 
  --  >90 >90 >90   CALCIUM  --    < >  --   --  8.3* 7.8* 7.8*   CKTOTAL  --   --  99  --   --   --   --    MYOGLOBIN  --   --  27  --   --   --   --    LACTACIDWB 1.7  --   --  1.0 0.8  --   --     < > = values in this interval not displayed.     Recent Labs     12/01/24  0712 12/01/24  1810 12/02/24  0412 12/03/24  0557   TSH 0.54  --   --   --    AST 1,480* 2,017* 1,279* 369*   * 1,187* 924* 588*   LDH 1,597*  --   --   --    GGT 63*  --   --   --    ALKPHOS 102 117* 107* 140*   BILITOT 1.0 0.8 0.6 0.5   BILIDIR 0.6* 0.5* 0.4* 0.3*     ABG:No results found for: \"POCPH\", \"PHART\", \"PH\", \"POCPCO2\", \"NPO0RDJ\", \"PCO2\", \"POCPO2\", \"PO2ART\", \"PO2\", \"POCHCO3\", \"EOH8RIF\", \"HCO3\", \"NBEA\", \"PBEA\", \"BEART\", \"BE\", \"THGBART\", \"THB\", \"HFD5VID\", \"AYAQ0MMG\", \"E9IDGTCG\", \"O2SAT\", \"FIO2\"  Lab Results   Component Value Date/Time    SPECIAL Site: [place], Middle 12/01/2024 09:58 AM     Lab Results   Component Value Date/Time    CULTURE CULTURE IN PROGRESS 12/01/2024 09:58 AM       Radiology:  US SPLEEN    Result Date: 12/3/2024  No splenic enlargement or mass.     CT CHEST PULMONARY EMBOLISM W CONTRAST    Result Date: 12/1/2024  1. No evidence of pulmonary embolism or acute pulmonary abnormality. 2. Traction bronchiectasis within the medial segment right middle lobe, anterior segment right upper lobe, and inferior lingular segment left upper lobe.  Recommend pulmonology consultation.     XR HAND LEFT (MIN 3 VIEWS)    Result Date: 12/1/2024  No acute osseous abnormality.     US GALLBLADDER RUQ    Result Date: 12/1/2024  Unremarkable right upper quadrant ultrasound.     XR CHEST PORTABLE    Result Date: 11/30/2024  1. Mild right basilar linear opacities, possibly representing atelectasis, pneumonia, or prominent vasculature. 2. Otherwise no acute cardiopulmonary abnormality.     XR HAND LEFT (MIN 3 VIEWS)    Result Date: 11/26/2024  No acute fracture or dislocation is identified.       Physical Examination:        General 
pancytopenia particularly leukopenia, markedly elevated transaminases. Not entirely sure how much of current illness is from her autoimmune disease vs various infections and some type of acute liver injury of unclear cause. Has had +TOMI by IF with other (-) serologies before.     Improving on current treatment prednisone 20 twice daily. Will cont current steroid dose for now.   CBC with differential shows improved total WBC and ANC 0.52.  ID on board, continuing Unasyn for finger abscess and acyclovir for groin lesions. Plastic surgery recommendations noted.    Wait for final recommendations to be followed in attendings attestation.      Rosario Conley MD   Internal medicine resident, PGY-3  12/4/2024   11:25 AM   Clinically gradually improving with less fever spike, reported drying of some of the herpetic lesions and further drainage, debridement of finger wound. Hem-onc note reviewed unclear if neutropenia drug induced or possibly related to her CTD although no dramatic response to the increased prednisone so far. Monitor counts closely over next few days. Dr. Corral will follow over the weekend.  
vasculature. 2. Otherwise no acute cardiopulmonary abnormality.     XR HAND LEFT (MIN 3 VIEWS)    Result Date: 11/26/2024  No acute fracture or dislocation is identified.       Physical Examination:        General appearance:  alert, cooperative and no distress  Mouth-mucositis  Mental Status:  oriented to person, place and time and normal affect  Lungs:  clear to auscultation bilaterally, normal effort  Heart:  regular rate and rhythm, no murmur  Abdomen: Slight RUQ tenderness  extremities: Positive for edema  Skin: Left middle finger swelling/discoloration status post I&D  Genital urinary exam-multiple vesicular lesions in the genital area with discharge, tender to touch    Assessment:        Hospital Problems             Last Modified POA    * (Principal) Cellulitis 11/30/2024 Yes    Folliculitis of perineum 12/1/2024 Yes    Abnormal LFTs 12/2/2024 Yes    Tachycardia 12/1/2024 Yes    Skin ulcer of perineum, limited to breakdown of skin ([facility]) 12/1/2024 Yes    Abscess around nail of left index finger 12/1/2024 Yes    Immunosuppressed due to chemotherapy ([facility]) 12/1/2024 Yes    SLE (systemic lupus erythematosus related syndrome) ([facility]) 12/1/2024 Yes    CRP elevated 12/1/2024 Yes    Pancytopenia ([facility]) 12/2/2024 Yes    Positive D dimer 12/2/2024 Yes    Herpes simplex virus (HSV) infection 12/2/2024 Yes       Plan:      Sepsis  Left middler finger paronychia/abscess  Lupus/GPA vasculitis/chronic immunosuppression  Pancytopenia secondary to above  Genital infection/folliculitis/mucositis-HSV infection  Transaminitis  Elevated D-dimer    -Continue IV fluids.  -Status post I&D of the finger abscess by plastic surgery, continues to be on vancomycin, cefepime, ID service following , continue Betadine soaks/dressing changes per plastic surgery, pain control  -Hold Plaquenil, started on prednisone 10 mg twice daily, appreciate rheumatology service recommendations  -Heme-onc service consulted as well with worsening 
Vaginal BID    sodium chloride flush  5-40 mL IntraVENous 2 times per day    enoxaparin  30 mg SubCUTAneous BID       Social History:     Social History     Socioeconomic History    Marital status: Single     Spouse name: Not on file    Number of children: Not on file    Years of education: Not on file    Highest education level: Not on file   Occupational History    Not on file   Tobacco Use    Smoking status: Never    Smokeless tobacco: Never   Substance and Sexual Activity    Alcohol use: No    Drug use: Yes     Types: Marijuana (Weed)     Comment: few days weekly    Sexual activity: Yes     Partners: Male   Other Topics Concern    Not on file   Social History Narrative    Not on file     Social Determinants of Health     Financial Resource Strain: Not on file   Food Insecurity: No Food Insecurity (11/30/2024)    Hunger Vital Sign     Worried About Running Out of Food in the Last Year: Never true     Ran Out of Food in the Last Year: Never true   Transportation Needs: No Transportation Needs (11/30/2024)    PRAPARE - Transportation     Lack of Transportation (Medical): No     Lack of Transportation (Non-Medical): No   Physical Activity: Not on file   Stress: Not on file   Social Connections: Not on file   Intimate Partner Violence: Not At Risk (12/11/2023)    Received from [facility], [facility]    [place] Safety & Environment     Within the last year, have you been afraid of your partner or ex-partner?: No     Within the last year, have you been humiliated or emotionally abused in other ways by your partner or ex-partner?: No     Within the last year, have you been kicked, hit, slapped, or otherwise physically hurt by your partner or ex-partner?: No     Within the last year, have you been raped or forced to have any kind of sexual activity by your partner or ex-partner?: No     Physically or Sexually Abused: Not on file   Housing Stability: Low Risk  (11/30/2024)    Housing Stability Vital 
[phone]  Perfect serve / office [phone]          
medical record where necessary.    I agree with the assessment, plan and orders as documented by the APRN.    In addition diagnostic and decision making elements, performed by the Attending Physician, are included in the Diagnostic and Decision Making Section of the text.    Larry Chris MD     12/7/2024      [facility] - Daily Progress Note  Today's Date and Time: 12/7/2024, 2:42 PM    Impression :   HSV 1 genital and pelvic infection  Vaginitis with dysplasia s/p resection  Left finger repair in Era and fingertip abscess  Failed antibiotic treatment with Keflex and Bactrim  CRP elevation  Lupus and GPA vasculitis  Immunosuppressed on Plaquenil, rituximab and prednisone.    Recommendations:   Please follow suggestions as per Aorose's recommendations:  Plastic surgery evaluation - bedside I/D finger abscess -gram-negative anaerobes, group B strep, Enterococcus sensitive to ampicillin  12/4 Unasyn IV until 12/10/24   12/4 discussed with Dr. Kyle, the blister over the finger might have started as a brody but became superinfected  12/4 plastic  lanced the remaining blister  12/6/24: fingernail removed by Plastic Surgery because of purulence noted at trephination site  HSV 1  pubic rash/ ulcers ( PCR +) initial infection  Keep acyclovir IV until lesions dry, then switch to po course for a total 14 days  Will benefit after that from a acyclovir 400 mg po daily long term suppression   12/4 groin lesions starting to try partially, but the labial area continues to be very raw  ongoing pain of the labia  and perianal - add lidocaine cream    Interval History:     12/7/2024    Afebrile  VSS on room air    The patient is A&O x 4.  She has some residual groin discomfort.     Unasyn and acyclovir continued.  Plaquenil held and prednisone continues    Lab work reviewed:  CRP remains elevated 85.1, but is on a downtrend.  Pancytopenia continues with a WBC of 0.8, hemoglobin of 7.8 
(L) 3.5 - 5.2 g/dL    Albumin/Globulin Ratio 1.0 1.0 - 2.5    Total Bilirubin 0.9 0.0 - 1.2 mg/dL    Alkaline Phosphatase 94 35 - 104 U/L     (H) 10 - 35 U/L    AST 1,278 (H) 10 - 35 U/L   HCG Qualitative, Serum   Result Value Ref Range    Preg, Serum NEGATIVE NEGATIVE   Herpes Simplex 1 & 2, Molecular   Result Value Ref Range    Specimen Description .THIGH     HSV 1, NaaT POSITIVE (A) NEGATIVE    HSV 2, NaaT NEGATIVE NEGATIVE   Protime-INR   Result Value Ref Range    Protime 14.7 11.7 - 14.9 sec    INR 1.2    APTT   Result Value Ref Range    APTT 24.8 23.0 - 36.5 sec   HIV Screen   Result Value Ref Range    HIV Ag/Ab NONREACTIVE NONREACTIVE   Hepatitis Panel, Acute   Result Value Ref Range    Hepatitis B Surface Ag NONREACTIVE NONREACTIVE    Hepatitis C Ab NONREACTIVE NONREACTIVE    Hep B Core Ab, IgM NONREACTIVE NONREACTIVE    Hep A IgM NONREACTIVE NONREACTIVE   C3 COMPLEMENT   Result Value Ref Range    Complement C3 190 (H) 90 - 180 mg/dL   C4 COMPLEMENT   Result Value Ref Range    Complement C4 34 10 - 40 mg/dL   TOMI SCREEN WITH REFLEX   Result Value Ref Range    TOMI NEGATIVE NEGATIVE    SREEKANTH Antibodies Screen 0.3 <0.7 U/mL    Anti ds DNA 1.2 <10.0 IU/mL   C-Reactive Protein   Result Value Ref Range    .0 (H) 0.0 - 5.0 mg/L   Procalcitonin   Result Value Ref Range    Procalcitonin 0.70 (H) 0.00 - 0.09 ng/mL   Sedimentation Rate   Result Value Ref Range    Sed Rate, Automated 67 (H) 0 - 20 mm/Hr   IgE   Result Value Ref Range    IgE <1 0 - 100 IU/mL   Lactic Acid   Result Value Ref Range    Lactic Acid, Whole Blood 1.0 0.7 - 2.1 mmol/L   Lactic Acid   Result Value Ref Range    Lactic Acid, Whole Blood 0.8 0.7 - 2.1 mmol/L   Protime-INR   Result Value Ref Range    Protime 17.4 (H) 11.7 - 14.9 sec    INR 1.5    Basic Metabolic Panel w/ Reflex to MG   Result Value Ref Range    Sodium 135 (L) 136 - 145 mmol/L    Potassium 3.9 3.7 - 5.3 mmol/L    Chloride 103 98 - 107 mmol/L    CO2 19 (L) 20 - 31 mmol/L 
anicteric; conjunctivae pink\"} No hemorhages, no embolic phenomena.  ENT: Oropharynx clear, without erythema, exudate, or thrush.No tenderness of sinuses. No nasal drainage.  Neck: Supple, without lymphadenopathy. No JVD  Pulmonary/Chest: Clear to auscultation, without wheezes, rales, or rhonchi. No areas of consolidation.Good air movement bilaterally. No egophony.  Cardiovascular: Regular rate and rhythm without murmurs, rubs, or gallops. S1 and S2 normal.  Abdomen: Soft, no tenderness, bowel sounds normal  All four Extremities: No cyanosis, clubbing, edema, or effusions.  Neurologic: No gross sensory or motor deficits.  Skin: Lesions to groin and labia . Left middle fingernail removed IV site inspected: no inflammation.      Medical Decision Making:   I have independently reviewed/ordered the following labs:    CBC with Differential:   Recent Labs     12/06/24  0641 12/07/24  0700   WBC 1.2* 0.8*   HGB 8.5* 7.8*   HCT 26.3* 24.6*    135*   LYMPHOPCT 52* 48*   MONOPCT 1 2   EOSPCT 1 0*     BMP:   Recent Labs     12/07/24  0700      K 4.1      CO2 24   BUN 6   CREATININE 0.4*     Hepatic Function Panel:   Recent Labs     12/07/24  0700   BILITOT 0.4   ALKPHOS 173*   *   AST 77*     No results found for: \"VANCOTROUGH\"     Medications:      sennosides-docusate sodium  2 tablet Oral Daily    neomycin-bacitracin-polymyxin   Topical BID    lidocaine   Topical BID    ampicillin-sulbactam  3,000 mg IntraVENous Q6H    acyclovir  5 mg/kg (Adjusted) IntraVENous Q8H    predniSONE  20 mg Oral BID    lidocaine  1 patch TransDERmal Daily    magic (miracle) mouthwash  15 mL Swish & Swallow TID    polyethylene glycol  17 g Oral BID    famotidine  20 mg Oral BID    miconazole   Topical BID    [Held by provider] hydroxychloroquine  200 mg Oral Daily    clotrimazole   Vaginal BID    sodium chloride flush  5-40 mL IntraVENous 2 times per day    enoxaparin  30 mg SubCUTAneous BID       Thank you for allowing us 
1.2 mg/dL    Bilirubin, Direct 0.5 (H) 0.0 - 0.2 mg/dL    Bilirubin, Indirect 0.3 0.0 - 1.0 mg/dL    Total Protein 7.5 6.6 - 8.7 g/dL    Globulin 3.8 g/dL    Albumin/Globulin Ratio 1.0 1.0 - 2.5   CBC with Auto Differential   Result Value Ref Range    WBC 0.6 (LL) 3.5 - 11.3 k/uL    RBC 2.55 (L) 3.95 - 5.11 m/uL    Hemoglobin 7.8 (L) 11.9 - 15.1 g/dL    Hematocrit 24.4 (L) 36.3 - 47.1 %    MCV 95.7 82.6 - 102.9 fL    MCH 30.6 25.2 - 33.5 pg    MCHC 32.0 28.4 - 34.8 g/dL    RDW 14.4 11.8 - 14.4 %    Platelets See Reflexed IPF Result 138 - 453 k/uL    Platelet, Fluorescence 96 (L) 138 - 453 k/uL    Platelet, Immature Fraction 3.6 1.1 - 10.3 %    NRBC Automated 0.0 0.0 per 100 WBC    Immature Granulocytes % 0 0 %    Neutrophils % 68 (H) 36 - 66 %    Lymphocytes % 28 24 - 44 %    Monocytes % 4 1 - 7 %    Eosinophils % 0 (L) 1 - 4 %    Basophils % 0 0 - 2 %    Immature Granulocytes Absolute 0.00 0.00 - 0.30 k/uL    Neutrophils Absolute 0.41 (LL) 1.8 - 7.7 k/uL    Lymphocytes Absolute 0.17 (L) 1.0 - 4.8 k/uL    Monocytes Absolute 0.02 (L) 0.1 - 0.8 k/uL    Eosinophils Absolute 0.00 0.0 - 0.4 k/uL    Basophils Absolute 0.00 0.0 - 0.2 k/uL    Cells Counted 25     Morphology INCREASED BANDS PRESENT     Morphology TOXIC GRANULATION PRESENT     Morphology VACUOLATED NEUTROPHILS PRESENT    Basic Metabolic Panel w/ Reflex to MG   Result Value Ref Range    Sodium 135 (L) 136 - 145 mmol/L    Potassium 3.6 (L) 3.7 - 5.3 mmol/L    Chloride 102 98 - 107 mmol/L    CO2 22 20 - 31 mmol/L    Anion Gap 11 9 - 16 mmol/L    Glucose 114 (H) 74 - 99 mg/dL    BUN 6 6 - 20 mg/dL    Creatinine 0.5 (L) 0.6 - 0.9 mg/dL    Est, Glom Filt Rate >90 >60 mL/min/1.73m2    Calcium 7.8 (L) 8.6 - 10.4 mg/dL   Vancomycin Level, Random   Result Value Ref Range    Vancomycin Rm 12.3 5.0 - 40.0 ug/mL   Protime-INR   Result Value Ref Range    Protime 16.5 (H) 11.7 - 14.9 sec    INR 1.4    Hepatic Function Panel   Result Value Ref Range    Albumin 3.2 (L) 3.5 - 
drug-induced agranulocytosis however bone marrow disorder cannot be ruled out.  Patient was also on Bactrim before admission  Patient at this point reluctant to proceed with a bone marrow biopsy.  Continue to monitor    Discussed with patient and Nurse.      Thank you for asking us to see this patient.          Hussain Parmar MD          This note is created with the assistance of a speech recognition program.  While intending to generate a document that actually reflects the content of the visit, the document can still have some errors including those of syntax and sound a like substitutions which may escape proof reading.  It such instances, actual meaning can be extrapolated by contextual diversion.    
visualized bones unremarkable.     1. Mild right basilar linear opacities, possibly representing atelectasis, pneumonia, or prominent vasculature. 2. Otherwise no acute cardiopulmonary abnormality.     XR HAND LEFT (MIN 3 VIEWS)    Result Date: 11/26/2024  EXAMINATION: THREE XRAY VIEWS OF THE LEFT HAND 11/26/2024 6:26 pm COMPARISON: 08/09/2024 HISTORY: ORDERING SYSTEM PROVIDED HISTORY: Middle finger pain, c/f fracture TECHNOLOGIST PROVIDED HISTORY: Middle finger pain, c/f fracture FINDINGS: No acute fracture or dislocation is identified.  The visualized joint spaces are preserved.  The surrounding soft tissues are within normal limits.     No acute fracture or dislocation is identified.         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] 12/03/2024    Pancytopenia (HCC) [D61.818] 12/02/2024    Positive D dimer [R79.89] 12/02/2024    Herpes simplex infection of genitourinary system [A60.00] 12/02/2024    Folliculitis of perineum [L73.9] 12/01/2024    Abnormal LFTs [R79.89] 12/01/2024    Tachycardia [R00.0] 12/01/2024    Skin ulcer of perineum, limited to breakdown of skin (HCC) [L98.491] 12/01/2024    Abscess around nail of left index finger [L03.012] 12/01/2024    Immunosuppressed due to chemotherapy (HCC) [D84.821, T45.1X5A, Z79.899] 12/01/2024    SLE (systemic lupus erythematosus related syndrome) (HCC) [M32.9] 12/01/2024    CRP elevated [R79.82] 12/01/2024    Cellulitis [L03.90] 11/30/2024         Pancytopenia with severe neutropenia  Abnormal LFT  Lupus  Primary care  Vaginal HSV rash    RECOMMENDATIONS:  I reviewed the labs/imaging available to me,outside records and discussed with the patient.I explained to the patient the nature of this problem. I explained the significance of these abnormalities and possible etiology and management options  Reviewed previous medical records  Discussed differential 
records  Discussed differential diagnosis of pancytopenia  Agree with holding Plaquenil  Avoid myelotoxic drugs  Neutropenia likely associated to underlying lupus and immunosuppressive drugs.    Acute illness / sepsis also contributing to marrow suppression.  Primary bone marrow disorder is also in the differential but less likely.  Will continue to monitor.   Patient hemodynamically stable.  Will continue to monitor.  Hold off on growth factor support.  Peripheral smear and flow cytometry unremarkable  Spleen ultrasound unremarkable  Continue supportive care and abx  No objection to discharge from my standpoint     Discussed with patient and Nurse.      Thank you for asking us to see this patient.            Ralph Salinas MD  Hematologist/Medical Oncologist  [facility]            This note is created with the assistance of a speech recognition program.  While intending to generate a document that actually reflects the content of the visit, the document can still have some errors including those of syntax and sound a like substitutions which may escape proof reading.  It such instances, actual meaning can be extrapolated by contextual diversion.

## 2024-12-10 NOTE — DISCHARGE SUMMARY
[facility]  Office: [phone]  Luis Chau DO, Dave Ferrara DO, David Yañez DO, Attila Mitchell DO, Moreno Kang MD, No Lara MD, Norman Kingston MD, Cristin Cartagena MD,  Michael Lambert MD, Alan Fernandez MD, Maged Rodriguez MD,  Ralph Almaguer DO, Colby Rodrigez MD, Reese Fernandez MD, Tc Chau DO, Jenny Latham MD,  Vance Her DO, Yuli Ramirez MD, Alejandra Cooper MD, Kym Beatty MD, Mary Wilson MD,  Magnus Jones MD, Maribel Tompkins MD, Bennett Villagran MD, Bobby Roca MD, Ramiro Howe MD, Michael Mckeon MD, Jimbo Dotson DO, Jez Newman MD, Shirley Waterhouse, CNP,  Karlee Funes, CNP, Jimbo Soares, CNP,  Jolynn Avila, GERSON, Kalie Rivera, CNP, Lara Thapa, CNP, Brianne Momin, CNP, Annette Spivey, CNP, Gretchen Cruz PA-C, Laney Preston PA-C, Dayana Le, CNP, Nikos Nowak, CNP,  Barb Sims, CNP, Gifty Taylor, CNP,  Judith Gillette, CNP, Bonnie zAul, CNP         [facility]   IN-PATIENT SERVICE   [facility]    Discharge Summary     Patient ID: Patricia Bansal  :  2000   MRN: [id number]     ACCOUNT:  [id number]   Patient's PCP: Ángela Bernstein MD  Admit Date: 2024   Discharge Date: 12/10/2024     Length of Stay: 10  Code Status:  Full Code  Admitting Physician: No admitting provider for patient encounter.  Discharge Physician: Jimbo Dotson DO     Active Discharge Diagnoses:     Hospital Problem Lists:  Principal Problem:    Cellulitis  Active Problems:    Folliculitis of perineum    Abnormal LFTs    Tachycardia    Skin ulcer of perineum, limited to breakdown of skin (HCC)    Abscess around nail of left index finger    Immunosuppressed due to chemotherapy (HCC)    SLE (systemic lupus erythematosus related syndrome) (HCC)    CRP elevated    Pancytopenia (HCC)    Positive D dimer    HSV-1 (herpes simplex virus 1) infection    Vulvar intraepithelial neoplasia (MARY) grade 3    Immunocompromised

## 2024-12-17 ENCOUNTER — OFFICE VISIT (OUTPATIENT)
Dept: BURN CARE | Age: 24
End: 2024-12-17
Payer: MEDICAID

## 2024-12-17 ENCOUNTER — TELEPHONE (OUTPATIENT)
Dept: INFECTIOUS DISEASES | Age: 24
End: 2024-12-17

## 2024-12-17 VITALS — WEIGHT: 253 LBS | HEIGHT: 64 IN | BODY MASS INDEX: 43.19 KG/M2

## 2024-12-17 DIAGNOSIS — L03.012 ABSCESS AROUND NAIL OF LEFT MIDDLE FINGER: Primary | ICD-10-CM

## 2024-12-17 PROCEDURE — 99213 OFFICE O/P EST LOW 20 MIN: CPT | Performed by: NURSE PRACTITIONER

## 2024-12-17 NOTE — TELEPHONE ENCOUNTER
Patient was on IV Acyclovir while in the hospital and is now on oral. She has developed hives. What do you want her to do?

## 2024-12-17 NOTE — PROGRESS NOTES
Number of Times Moved in the Last Year: 1     Homeless in the Last Year: No       Family History:  No family history on file.    Review of Systems   Constitutional:  Negative for chills and fever.   HENT: Negative.     Respiratory: Negative.     Musculoskeletal: Negative.    Skin:         Pain left middle finger   Neurological: Negative.          PHYSICAL EXAM:  Ht 1.626 m (5' 4.02\")   Wt 114.8 kg (253 lb)   LMP 11/11/2024 (Approximate)   BMI 43.41 kg/m²   CONSTITUTIONAL: awake, alert, cooperative, no apparent distress  Physical Exam  HENT:      Head: Normocephalic.   Pulmonary:      Effort: Pulmonary effort is normal.   Musculoskeletal:         General: Normal range of motion.   Skin:     General: Skin is warm and dry.      Comments: Mild erythema and edema left middle fingertip. No drainage noted.    Neurological:      General: No focal deficit present.      Mental Status: She is alert.            1. Abscess around nail of left middle finger         PLAN:  - Bacitracin dressing twice daily  -Wash gently w/ soap and water before dressing changes  -Avoid direct sun exposure & stay well hydrated  -Tylenol/Ibuprofen/ for pain control  -F/u 4 weeks      Hodan Powers, KUSHAL  [place], [place]  10:26 AM 12/17/2024

## 2024-12-18 ASSESSMENT — ENCOUNTER SYMPTOMS: RESPIRATORY NEGATIVE: 1

## 2024-12-26 RX ORDER — ACYCLOVIR 800 MG/1
TABLET ORAL
Qty: 42 TABLET | Refills: 0 | OUTPATIENT
Start: 2024-12-26

## 2024-12-26 NOTE — TELEPHONE ENCOUNTER
Patient stated she had an allergic reaction to the Acyclovir. She has an appointment with you 1-6-25. Sent back to pharmacy as a refusal refill.

## 2025-01-06 ENCOUNTER — OFFICE VISIT (OUTPATIENT)
Dept: INFECTIOUS DISEASES | Age: 25
End: 2025-01-06
Payer: MEDICAID

## 2025-01-06 VITALS
DIASTOLIC BLOOD PRESSURE: 79 MMHG | HEART RATE: 118 BPM | SYSTOLIC BLOOD PRESSURE: 117 MMHG | HEIGHT: 64 IN | BODY MASS INDEX: 39.78 KG/M2 | WEIGHT: 233 LBS | TEMPERATURE: 97 F

## 2025-01-06 DIAGNOSIS — D84.9 IMMUNOSUPPRESSED STATUS (HCC): ICD-10-CM

## 2025-01-06 DIAGNOSIS — A60.9 HSV (HERPES SIMPLEX VIRUS) ANOGENITAL INFECTION: Primary | ICD-10-CM

## 2025-01-06 PROCEDURE — 99214 OFFICE O/P EST MOD 30 MIN: CPT | Performed by: INTERNAL MEDICINE

## 2025-01-06 RX ORDER — ONDANSETRON 4 MG/1
4 TABLET, ORALLY DISINTEGRATING ORAL EVERY 12 HOURS PRN
Qty: 21 TABLET | Refills: 5 | Status: SHIPPED | OUTPATIENT
Start: 2025-01-06 | End: 2025-03-10

## 2025-01-06 RX ORDER — ACYCLOVIR 400 MG/1
400 TABLET ORAL 3 TIMES DAILY
Qty: 30 TABLET | Refills: 0 | Status: SHIPPED | OUTPATIENT
Start: 2025-01-06 | End: 2025-01-16

## 2025-01-06 ASSESSMENT — ENCOUNTER SYMPTOMS
EYE DISCHARGE: 0
COLOR CHANGE: 0
ABDOMINAL DISTENTION: 0
APNEA: 0

## 2025-01-06 NOTE — PROGRESS NOTES
medications, social history, and family history, and I haveupdated the database accordingly.  Past Medical History:     Past Medical History:   Diagnosis Date    Lupus        Past Surgical  History:   No past surgical history on file.    Medications:     Current Outpatient Medications:     ondansetron (ZOFRAN-ODT) 4 MG disintegrating tablet, Take 1 tablet by mouth every 12 hours as needed for Nausea or Vomiting, Disp: 21 tablet, Rfl: 5    acyclovir (ZOVIRAX) 400 MG tablet, Take 1 tablet by mouth 3 times daily for 10 days X 1 0 days then drop to 2 x per day long term - call me if issues, Disp: 30 tablet, Rfl: 0    acyclovir (ZOVIRAX) 400 MG tablet, Take 1 tablet by mouth 2 times daily Start 11/25/24 and keep long term to suppress the herpes from coming back-, Disp: 60 tablet, Rfl: 11    neomycin-bacitracin-polymyxin (NEOSPORIN) 5-400-5000 ointment, Apply topically 4 times daily., Disp: 5 g, Rfl: 3    docusate sodium (COLACE, DULCOLAX) 100 MG CAPS, Take 200 mg by mouth 2 times daily, Disp: 40 capsule, Rfl: 0    miconazole (MICOTIN) 2 % cream, Apply topically 2 times daily., Disp: , Rfl:     lidocaine (XYLOCAINE) 5 % ointment, Apply topically as needed., Disp: 50 g, Rfl: 1    acetaminophen (TYLENOL) 325 MG tablet, Take 2 tablets by mouth every 6 hours as needed for Pain, Disp: 120 tablet, Rfl: 0    ibuprofen (ADVIL;MOTRIN) 600 MG tablet, Take 1 tablet by mouth every 6 hours as needed for Pain, Disp: 30 tablet, Rfl: 0    albuterol (PROVENTIL) (2.5 MG/3ML) 0.083% nebulizer solution, Inhale 3 mLs into the lungs, Disp: , Rfl:       Social History:     Social History     Socioeconomic History    Marital status: Single     Spouse name: Not on file    Number of children: Not on file    Years of education: Not on file    Highest education level: Not on file   Occupational History    Not on file   Tobacco Use    Smoking status: Never    Smokeless tobacco: Never   Substance and Sexual Activity    Alcohol use: No    Drug use: Yes

## 2025-02-05 RX ORDER — ACYCLOVIR 400 MG/1
TABLET ORAL
Qty: 30 TABLET | OUTPATIENT
Start: 2025-02-05

## 2025-02-11 NOTE — TELEPHONE ENCOUNTER
Patient called, states the pharmacy doesn't have a current script on hand. Writer pended another script, please sign for it.

## 2025-02-13 RX ORDER — ACYCLOVIR 400 MG/1
400 TABLET ORAL 2 TIMES DAILY
Qty: 60 TABLET | Refills: 11 | Status: SHIPPED | OUTPATIENT
Start: 2025-02-13 | End: 2026-02-08

## 2025-02-17 ENCOUNTER — OFFICE VISIT (OUTPATIENT)
Dept: INFECTIOUS DISEASES | Age: 25
End: 2025-02-17
Payer: MEDICAID

## 2025-02-17 VITALS
BODY MASS INDEX: 40.97 KG/M2 | RESPIRATION RATE: 16 BRPM | TEMPERATURE: 97.5 F | HEART RATE: 94 BPM | DIASTOLIC BLOOD PRESSURE: 84 MMHG | WEIGHT: 240 LBS | SYSTOLIC BLOOD PRESSURE: 140 MMHG | HEIGHT: 64 IN | OXYGEN SATURATION: 99 %

## 2025-02-17 DIAGNOSIS — D70.1 CHEMOTHERAPY-INDUCED NEUTROPENIA: ICD-10-CM

## 2025-02-17 DIAGNOSIS — T45.1X5A IMMUNOSUPPRESSED DUE TO CHEMOTHERAPY: ICD-10-CM

## 2025-02-17 DIAGNOSIS — Z79.899 IMMUNOSUPPRESSED DUE TO CHEMOTHERAPY: ICD-10-CM

## 2025-02-17 DIAGNOSIS — A60.00 HERPES SIMPLEX INFECTION OF GENITOURINARY SYSTEM: Primary | ICD-10-CM

## 2025-02-17 DIAGNOSIS — T45.1X5A CHEMOTHERAPY-INDUCED NEUTROPENIA: ICD-10-CM

## 2025-02-17 DIAGNOSIS — D84.821 IMMUNOSUPPRESSED DUE TO CHEMOTHERAPY: ICD-10-CM

## 2025-02-17 PROCEDURE — 99214 OFFICE O/P EST MOD 30 MIN: CPT | Performed by: INTERNAL MEDICINE

## 2025-02-17 PROCEDURE — G2211 COMPLEX E/M VISIT ADD ON: HCPCS | Performed by: INTERNAL MEDICINE

## 2025-02-17 ASSESSMENT — ENCOUNTER SYMPTOMS
ABDOMINAL DISTENTION: 0
APNEA: 0
EYE DISCHARGE: 0
COLOR CHANGE: 0

## 2025-02-17 NOTE — PROGRESS NOTES
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 - we ll call pharmacy because it was sent to them in post admission  1/6 ulcers only in the back between the buttocks - w pain  Plan: back to 400 mg 3 x per day x 10 days then drop to 2 x per  day long term  She has been off acyclovir x 1 month due to pharmacy mistake  Will need zofran w it to avoid nausea that she s been getting  Neutropenia  Stay on -prednisone and plaquenil till all the lesions are gone    2/17/25  No genital pain and even after urination  No nausea, vomiting, diarrhea and no SOB  On acyclovir  400 mg po 2 x per day life long  on -prednisone and plaquenil long term  No new labs - last WBC 2 on 1/7/25  Exam neg - finger completely healed  Keep acyclovir long term 400 mg po  2 x per day -  Get Cbc diff  - ( low WBC from meds and lupus)         Diagnosis Orders   1. Herpes simplex infection of genitourinary system  CBC with Auto Differential      2. Immunosuppressed due to chemotherapy (HCC)  CBC with Auto Differential      3. Chemotherapy-induced neutropenia (HCC)  CBC with Auto